# Patient Record
Sex: FEMALE | Race: BLACK OR AFRICAN AMERICAN | NOT HISPANIC OR LATINO | Employment: FULL TIME | URBAN - METROPOLITAN AREA
[De-identification: names, ages, dates, MRNs, and addresses within clinical notes are randomized per-mention and may not be internally consistent; named-entity substitution may affect disease eponyms.]

---

## 2020-03-17 ENCOUNTER — OFFICE VISIT (OUTPATIENT)
Dept: URGENT CARE | Facility: CLINIC | Age: 45
End: 2020-03-17
Payer: MEDICAID

## 2020-03-17 VITALS
HEART RATE: 105 BPM | OXYGEN SATURATION: 100 % | DIASTOLIC BLOOD PRESSURE: 90 MMHG | TEMPERATURE: 99 F | HEIGHT: 67 IN | SYSTOLIC BLOOD PRESSURE: 133 MMHG | BODY MASS INDEX: 23.17 KG/M2 | WEIGHT: 147.63 LBS

## 2020-03-17 DIAGNOSIS — R30.0 DYSURIA: ICD-10-CM

## 2020-03-17 DIAGNOSIS — K04.01 PULPITIS: ICD-10-CM

## 2020-03-17 DIAGNOSIS — K08.89 ODONTALGIA: Primary | ICD-10-CM

## 2020-03-17 LAB
B-HCG UR QL: NEGATIVE
BILIRUB UR QL STRIP: NEGATIVE
CTP QC/QA: YES
GLUCOSE UR QL STRIP: NEGATIVE
KETONES UR QL STRIP: NEGATIVE
LEUKOCYTE ESTERASE UR QL STRIP: NEGATIVE
PH, POC UA: 6 (ref 5–8)
POC BLOOD, URINE: NEGATIVE
POC NITRATES, URINE: NEGATIVE
PROT UR QL STRIP: NEGATIVE
SP GR UR STRIP: 1.02 (ref 1–1.03)
UROBILINOGEN UR STRIP-ACNC: NORMAL (ref 0.1–1.1)

## 2020-03-17 PROCEDURE — 81003 URINALYSIS AUTO W/O SCOPE: CPT | Mod: QW,S$GLB,, | Performed by: PHYSICIAN ASSISTANT

## 2020-03-17 PROCEDURE — 99213 PR OFFICE/OUTPT VISIT, EST, LEVL III, 20-29 MIN: ICD-10-PCS | Mod: 25,S$GLB,, | Performed by: PHYSICIAN ASSISTANT

## 2020-03-17 PROCEDURE — 81025 URINE PREGNANCY TEST: CPT | Mod: S$GLB,,, | Performed by: PHYSICIAN ASSISTANT

## 2020-03-17 PROCEDURE — 81025 POCT URINE PREGNANCY: ICD-10-PCS | Mod: S$GLB,,, | Performed by: PHYSICIAN ASSISTANT

## 2020-03-17 PROCEDURE — 99213 OFFICE O/P EST LOW 20 MIN: CPT | Mod: 25,S$GLB,, | Performed by: PHYSICIAN ASSISTANT

## 2020-03-17 PROCEDURE — 81003 POCT URINALYSIS, DIPSTICK, AUTOMATED, W/O SCOPE: ICD-10-PCS | Mod: QW,S$GLB,, | Performed by: PHYSICIAN ASSISTANT

## 2020-03-17 RX ORDER — METHOCARBAMOL 750 MG/1
1500 TABLET, FILM COATED ORAL 3 TIMES DAILY PRN
Qty: 20 TABLET | Refills: 0 | Status: SHIPPED | OUTPATIENT
Start: 2020-03-17 | End: 2020-03-27

## 2020-03-17 RX ORDER — AMLODIPINE BESYLATE 10 MG/1
10 TABLET ORAL DAILY
COMMUNITY

## 2020-03-17 RX ORDER — CHLORHEXIDINE GLUCONATE ORAL RINSE 1.2 MG/ML
10 SOLUTION DENTAL 2 TIMES DAILY
Qty: 200 ML | Refills: 0 | Status: SHIPPED | OUTPATIENT
Start: 2020-03-17 | End: 2020-03-27

## 2020-03-17 RX ORDER — AMOXICILLIN AND CLAVULANATE POTASSIUM 875; 125 MG/1; MG/1
1 TABLET, FILM COATED ORAL 2 TIMES DAILY
Qty: 20 TABLET | Refills: 0 | Status: SHIPPED | OUTPATIENT
Start: 2020-03-17 | End: 2020-03-27

## 2020-03-17 NOTE — PATIENT INSTRUCTIONS
"Continue with Tylenol as needed for pain. Robaxin for muscle stiffness/soreness. Be aware, this medication is sedating.  Do not mix with alcohol or any other sedating medications.  Do not drive or operate machinery when taking this medication.     Peridex mouthwash twice daily.     We will contact you with your vaginal swab results.    Please follow-up with a dentist.  Please follow-up with your OBGYN.  Dental Pain    A crack or cavity in a tooth can cause tooth pain. This is because the crack or cavity exposes the sensitive inner area of the tooth. An infection in the gum or the root of the tooth can cause pain and swelling. The pain is often made worse when you drink hot or cold beverages. It can also be worse when you bite on hard foods. Pain may spread from the tooth to your ear or the area of the jaw on the same side.  Home care  Follow these tips when caring for yourself at home:  · Avoid hot and cold foods and drinks. Your tooth may be sensitive to changes in temperature.  · Use toothpaste made for sensitive teeth. Brush gently up and down instead of sideways. Brushing sideways can wear away root surfaces if they are exposed.  · If your tooth is chipped or cracked, or if there is a large open cavity, put oil of cloves directly on the tooth to relieve pain. You can buy oil of cloves at drugstores. Some pharmacies carry an over-the-counter "toothache kit." This contains a paste that you can put on the exposed tooth to make it less sensitive.  · Put a cold pack on your jaw over the sore area to help reduce pain.  · You may use over-the-counter medicine to ease pain, unless your doctor prescribed another medicine. If you have chronic liver or kidney disease, talk with your healthcare provider before using acetaminophen or ibuprofen. Also talk with your provider if youve had a stomach ulcer or GI bleeding.  · If you have signs of an infection, you will be given an antibiotic. Take it as directed.  Follow-up " care  Follow up with your dentist, or as advised. Your pain may go away with the treatment given today. But only a dentist can fully look at and treat the cause of your pain. This will keep the pain from coming back.  Call 911  Call 911 if any of these occur:  · Unusual drowsiness  · Headache or stiff neck  · Weakness or fainting  · Difficulty swallowing or breathing  When to seek medical advice  Call your health care provider right away if any of these occur:  · Your face becomes swollen or red  · Pain gets worse or spreads to your neck  · Fever of 100.4º F (38.0º C) or higher, or as directed by your healthcare provider  · Pus drains from the tooth  Date Last Reviewed: 10/1/2016  © 5606-8414 [employer]. [address] [place] [address], [place] [address]. All rights reserved. This information is not intended as a substitute for professional medical care. Always follow your healthcare professional's instructions.

## 2020-03-17 NOTE — PROGRESS NOTES
"Subjective:       Patient ID: Marylin Jane is a 45 y.o. female.    Vitals:  height is 5' 7" (1.702 m) and weight is 67 kg (147 lb 9.6 oz). Her temperature is 98.8 °F (37.1 °C). Her blood pressure is 133/90 (abnormal) and her pulse is 105. Her oxygen saturation is 100%.     Chief Complaint: Dental Pain    Patient has pain inside of mouth with an infected tooth for two days    Dental Pain    This is a new problem. The current episode started in the past 7 days. The pain is at a severity of 9/10. Associated symptoms include difficulty swallowing. Pertinent negatives include no fever. Treatments tried: tylenol.       Constitution: Negative for chills, fatigue and fever.   HENT: Negative for congestion and sore throat.    Neck: Negative for painful lymph nodes.   Cardiovascular: Negative for chest pain and leg swelling.   Eyes: Negative for double vision and blurred vision.   Respiratory: Negative for cough and shortness of breath.    Gastrointestinal: Negative for abdominal pain, nausea, vomiting and diarrhea.   Genitourinary: Negative for dysuria, frequency, urgency, urine decreased, hematuria, history of kidney stones, painful menstruation, irregular menstruation, missed menses, heavy menstrual bleeding, ovarian cysts, genital trauma, vaginal pain, vaginal bleeding, vaginal odor, painful intercourse, genital sore, painful ejaculation and pelvic pain.   Musculoskeletal: Negative for joint pain, joint swelling, back pain, muscle cramps and muscle ache.        Pain inside of mouth with infected tooth   Skin: Positive for abscess. Negative for color change, pale, rash and bruising.   Allergic/Immunologic: Negative for seasonal allergies.   Neurological: Positive for tingling. Negative for dizziness, history of vertigo, light-headedness, passing out and headaches.   Hematologic/Lymphatic: Negative for swollen lymph nodes.   Psychiatric/Behavioral: Negative for nervous/anxious, sleep disturbance and depression. The patient " is not nervous/anxious.        Objective:      Physical Exam   Constitutional: She is oriented to person, place, and time. She appears well-developed and well-nourished. No distress.   Uncomfortable appearing, nontoxic   HENT:   Head: Normocephalic and atraumatic.   Tooth #32 cracked, pulp exposure. No intraoral edema. Jaw with full ROM without discomfort or difficulty.   Eyes: EOM are normal.   Neck: Normal range of motion. Neck supple.   Musculoskeletal: Normal range of motion.   Lymphadenopathy:     She has no cervical adenopathy.   Neurological: She is alert and oriented to person, place, and time.   Skin: Skin is warm, dry and not diaphoretic.   Psychiatric: She has a normal mood and affect. Her behavior is normal.   Nursing note and vitals reviewed.        Assessment:       1. Odontalgia    2. Dysuria    3. Pulpitis        Plan:         New vaginal discharge, +strong odor to urine. New sexual partner. Also odonatalgia with evidence of pulpitis. No jose's. No fever. No URI complaints. No significant comorbidities.     Vaginal swab, G/C pending. Will treat pain with Tylenol, robaxin. Start on Peridex, Augmentin    Odontalgia  -     Ambulatory referral/consult to Dentistry    Dysuria  -     POCT Urinalysis, Dipstick, Automated, W/O Scope  -     POCT urine pregnancy  -     NuSwab Vaginitis Plus (VG+)  -     C. trachomatis/N. gonorrhoeae by AMP DNA [facility]; Urine  -     C. trachomatis/N. gonorrhoeae by AMP DNA [facility]; Urine    Pulpitis  -     Ambulatory referral/consult to Dentistry    Other orders  -     methocarbamoL (ROBAXIN) 750 MG Tab; Take 2 tablets (1,500 mg total) by mouth 3 (three) times daily as needed (muscle stiffness/soreness).  Dispense: 20 tablet; Refill: 0  -     amoxicillin-clavulanate 875-125mg (AUGMENTIN) 875-125 mg per tablet; Take 1 tablet by mouth 2 (two) times daily. for 10 days  Dispense: 20 tablet; Refill: 0  -     chlorhexidine (PERIDEX) 0.12 % solution; Use as directed 10 mLs in the  mouth or throat 2 (two) times daily. for 10 days  Dispense: 200 mL; Refill: 0

## 2020-03-21 LAB
C TRACH RRNA SPEC QL NAA+PROBE: NEGATIVE
N GONORRHOEA RRNA SPEC QL NAA+PROBE: NEGATIVE

## 2020-03-23 ENCOUNTER — TELEPHONE (OUTPATIENT)
Dept: URGENT CARE | Facility: CLINIC | Age: 45
End: 2020-03-23

## 2020-03-25 LAB
A VAGINAE DNA VAG QL NAA+PROBE: ABNORMAL SCORE
BVAB2 DNA VAG QL NAA+PROBE: ABNORMAL SCORE
C ALBICANS DNA VAG QL NAA+PROBE: POSITIVE
C GLABRATA DNA VAG QL NAA+PROBE: NEGATIVE
C TRACH DNA VAG QL NAA+PROBE: NEGATIVE
MEGA1 DNA VAG QL NAA+PROBE: ABNORMAL SCORE
N GONORRHOEA DNA VAG QL NAA+PROBE: NEGATIVE
T VAGINALIS DNA VAG QL NAA+PROBE: NEGATIVE

## 2020-03-28 ENCOUNTER — TELEPHONE (OUTPATIENT)
Dept: URGENT CARE | Facility: CLINIC | Age: 45
End: 2020-03-28

## 2020-03-28 DIAGNOSIS — B96.89 BV (BACTERIAL VAGINOSIS): Primary | ICD-10-CM

## 2020-03-28 DIAGNOSIS — B37.9 YEAST INFECTION: ICD-10-CM

## 2020-03-28 DIAGNOSIS — N76.0 BV (BACTERIAL VAGINOSIS): Primary | ICD-10-CM

## 2020-03-28 RX ORDER — METRONIDAZOLE 7.5 MG/G
1 GEL VAGINAL NIGHTLY
Qty: 70 G | Refills: 0 | Status: SHIPPED | OUTPATIENT
Start: 2020-03-28 | End: 2020-04-02

## 2020-03-28 RX ORDER — FLUCONAZOLE 150 MG/1
150 TABLET ORAL DAILY
Qty: 2 TABLET | Refills: 0 | Status: SHIPPED | OUTPATIENT
Start: 2020-03-28 | End: 2020-03-29

## 2020-03-28 NOTE — TELEPHONE ENCOUNTER
Spoke with pt about results which show bacterial vaginosis and a yeast infection. I'm sending in flagyl and diflucan. Pt verbalized understanding and denied further questions.

## 2020-10-02 ENCOUNTER — CLINICAL SUPPORT (OUTPATIENT)
Dept: URGENT CARE | Facility: CLINIC | Age: 45
End: 2020-10-02
Payer: MEDICAID

## 2020-10-02 DIAGNOSIS — Z11.9 ENCOUNTER FOR SCREENING EXAMINATION FOR INFECTIOUS DISEASE: Primary | ICD-10-CM

## 2020-10-02 LAB
CTP QC/QA: YES
SARS-COV-2 RDRP RESP QL NAA+PROBE: NEGATIVE

## 2020-10-02 PROCEDURE — U0002 COVID-19 LAB TEST NON-CDC: HCPCS | Mod: QW,S$GLB,, | Performed by: NURSE PRACTITIONER

## 2020-10-02 PROCEDURE — U0002: ICD-10-PCS | Mod: QW,S$GLB,, | Performed by: NURSE PRACTITIONER

## 2020-12-07 ENCOUNTER — CLINICAL SUPPORT (OUTPATIENT)
Dept: URGENT CARE | Facility: CLINIC | Age: 45
End: 2020-12-07
Payer: MEDICAID

## 2020-12-07 DIAGNOSIS — Z11.9 ENCOUNTER FOR SCREENING EXAMINATION FOR INFECTIOUS DISEASE: Primary | ICD-10-CM

## 2020-12-07 LAB
CTP QC/QA: YES
SARS-COV-2 RDRP RESP QL NAA+PROBE: NEGATIVE

## 2020-12-07 PROCEDURE — 87635: ICD-10-PCS | Mod: QW,S$GLB,, | Performed by: INTERNAL MEDICINE

## 2020-12-07 PROCEDURE — 87635 SARS-COV-2 COVID-19 AMP PRB: CPT | Mod: QW,S$GLB,, | Performed by: INTERNAL MEDICINE

## 2020-12-07 NOTE — PATIENT INSTRUCTIONS
"Your test was NEGATIVE for COVID-19 (coronavirus).      You may leave home and/or return to work when the following conditions are met:   24 hours fever free without fever-reducing medications AND   Improved symptoms   You have not met the conditions of a closed exposure       A "close exposure" is defined as anyone who has had an exposure (masked or unmasked) to a known COVID -19 positive person within 6 ft for longer than 15 minutes. If your exposure meets this definition you are required by CDC guidelines to quarantine for 14 days from time of exposure regardless of test status.      Additional instructions:  · Social distance per your local guidelines  · Call ahead before visiting your doctor.  · Wear a facemask when around others who do not live in your household.  · Cover your coughs and sneezes.  · Wash your hands often with soap and water; hand  can be used, too.      If your symptoms worsen or if you have any other concerns, please contact [facility] at [phone].     Sincerely,    Leon Espinoza, RT  "

## 2020-12-07 NOTE — LETTER
[address] [facility], [address] [address]  Phone: [phone]  Fax: [phone]          Return to Work/School    Patient: Marylin Jane  YOB: 1975   Date: 12/07/2020     To Whom It May Concern:     Marylin Jane was in contact with/seen in my office on 12/07/2020. COVID-19 is present in our communities across the state. There is limited testing for COVID at this time, so not all patients can be tested. In this situation, your employee meets the following criteria:     Marylin Jane has met the criteria for COVID-19 testing and has a NEGATIVE result. The employee can return to work once they are asymptomatic for 24 hours without the use of fever reducing medications (Tylenol, Motrin, etc).     If you have any questions or concerns, or if I can be of further assistance, please do not hesitate to contact me.     Sincerely,    NURSE URGENT CARE, [facility] [place]

## 2021-03-20 ENCOUNTER — HOSPITAL ENCOUNTER (EMERGENCY)
Facility: HOSPITAL | Age: 46
Discharge: HOME | End: 2021-03-20
Attending: EMERGENCY MEDICINE
Payer: MEDICAID

## 2021-03-20 VITALS
DIASTOLIC BLOOD PRESSURE: 95 MMHG | OXYGEN SATURATION: 100 % | SYSTOLIC BLOOD PRESSURE: 139 MMHG | HEART RATE: 99 BPM | RESPIRATION RATE: 18 BRPM | BODY MASS INDEX: 25.34 KG/M2 | HEIGHT: 64 IN | TEMPERATURE: 99 F

## 2021-03-20 DIAGNOSIS — Z20.822 EXPOSURE TO COVID-19 VIRUS: Primary | ICD-10-CM

## 2021-03-20 LAB
B-HCG UR QL: NEGATIVE
CTP QC/QA: YES

## 2021-03-20 PROCEDURE — U0003 INFECTIOUS AGENT DETECTION BY NUCLEIC ACID (DNA OR RNA); SEVERE ACUTE RESPIRATORY SYNDROME CORONAVIRUS 2 (SARS-COV-2) (CORONAVIRUS DISEASE [COVID-19]), AMPLIFIED PROBE TECHNIQUE, MAKING USE OF HIGH THROUGHPUT TECHNOLOGIES AS DESCRIBED BY CMS-2020-01-R: HCPCS | Performed by: EMERGENCY MEDICINE

## 2021-03-20 PROCEDURE — 99284 EMERGENCY DEPT VISIT MOD MDM: CPT | Mod: 25,ER

## 2021-03-20 PROCEDURE — 81025 URINE PREGNANCY TEST: CPT | Mod: ER | Performed by: EMERGENCY MEDICINE

## 2021-03-20 RX ORDER — LOPERAMIDE HYDROCHLORIDE 2 MG/1
2 CAPSULE ORAL 4 TIMES DAILY PRN
Qty: 12 CAPSULE | Refills: 0 | Status: SHIPPED | OUTPATIENT
Start: 2021-03-20 | End: 2021-03-30

## 2021-03-20 RX ORDER — CETIRIZINE HYDROCHLORIDE 10 MG/1
10 TABLET ORAL DAILY
Qty: 30 TABLET | Refills: 0 | Status: SHIPPED | OUTPATIENT
Start: 2021-03-20 | End: 2022-03-20

## 2021-03-20 RX ORDER — ONDANSETRON 8 MG/1
8 TABLET, ORALLY DISINTEGRATING ORAL EVERY 6 HOURS PRN
Qty: 30 TABLET | Refills: 0 | Status: SHIPPED | OUTPATIENT
Start: 2021-03-20 | End: 2023-03-16 | Stop reason: CLARIF

## 2021-03-23 ENCOUNTER — TELEPHONE (OUTPATIENT)
Dept: EMERGENCY MEDICINE | Facility: HOSPITAL | Age: 46
End: 2021-03-23

## 2021-03-23 LAB — SARS-COV-2 RNA RESP QL NAA+PROBE: NOT DETECTED

## 2022-04-27 ENCOUNTER — OFFICE VISIT (OUTPATIENT)
Dept: URGENT CARE | Facility: CLINIC | Age: 47
End: 2022-04-27
Payer: MEDICAID

## 2022-04-27 VITALS
RESPIRATION RATE: 18 BRPM | HEIGHT: 64 IN | TEMPERATURE: 98 F | OXYGEN SATURATION: 97 % | DIASTOLIC BLOOD PRESSURE: 79 MMHG | WEIGHT: 147 LBS | HEART RATE: 104 BPM | BODY MASS INDEX: 25.1 KG/M2 | SYSTOLIC BLOOD PRESSURE: 123 MMHG

## 2022-04-27 DIAGNOSIS — M25.522 LEFT ELBOW PAIN: ICD-10-CM

## 2022-04-27 DIAGNOSIS — G89.29 CHRONIC ELBOW PAIN, LEFT: Primary | ICD-10-CM

## 2022-04-27 DIAGNOSIS — M25.522 CHRONIC ELBOW PAIN, LEFT: Primary | ICD-10-CM

## 2022-04-27 PROCEDURE — 73080 XR ELBOW COMPLETE 3 VIEW LEFT: ICD-10-PCS | Mod: LT,S$GLB,, | Performed by: RADIOLOGY

## 2022-04-27 PROCEDURE — 99203 PR OFFICE/OUTPT VISIT, NEW, LEVL III, 30-44 MIN: ICD-10-PCS | Mod: S$GLB,,, | Performed by: FAMILY MEDICINE

## 2022-04-27 PROCEDURE — 3074F PR MOST RECENT SYSTOLIC BLOOD PRESSURE < 130 MM HG: ICD-10-PCS | Mod: CPTII,S$GLB,, | Performed by: FAMILY MEDICINE

## 2022-04-27 PROCEDURE — 1160F RVW MEDS BY RX/DR IN RCRD: CPT | Mod: CPTII,S$GLB,, | Performed by: FAMILY MEDICINE

## 2022-04-27 PROCEDURE — 3078F PR MOST RECENT DIASTOLIC BLOOD PRESSURE < 80 MM HG: ICD-10-PCS | Mod: CPTII,S$GLB,, | Performed by: FAMILY MEDICINE

## 2022-04-27 PROCEDURE — 3008F BODY MASS INDEX DOCD: CPT | Mod: CPTII,S$GLB,, | Performed by: FAMILY MEDICINE

## 2022-04-27 PROCEDURE — 99203 OFFICE O/P NEW LOW 30 MIN: CPT | Mod: S$GLB,,, | Performed by: FAMILY MEDICINE

## 2022-04-27 PROCEDURE — 1159F MED LIST DOCD IN RCRD: CPT | Mod: CPTII,S$GLB,, | Performed by: FAMILY MEDICINE

## 2022-04-27 PROCEDURE — 3078F DIAST BP <80 MM HG: CPT | Mod: CPTII,S$GLB,, | Performed by: FAMILY MEDICINE

## 2022-04-27 PROCEDURE — 3074F SYST BP LT 130 MM HG: CPT | Mod: CPTII,S$GLB,, | Performed by: FAMILY MEDICINE

## 2022-04-27 PROCEDURE — 1159F PR MEDICATION LIST DOCUMENTED IN MEDICAL RECORD: ICD-10-PCS | Mod: CPTII,S$GLB,, | Performed by: FAMILY MEDICINE

## 2022-04-27 PROCEDURE — 1160F PR REVIEW ALL MEDS BY PRESCRIBER/CLIN PHARMACIST DOCUMENTED: ICD-10-PCS | Mod: CPTII,S$GLB,, | Performed by: FAMILY MEDICINE

## 2022-04-27 PROCEDURE — 73080 X-RAY EXAM OF ELBOW: CPT | Mod: LT,S$GLB,, | Performed by: RADIOLOGY

## 2022-04-27 PROCEDURE — 3008F PR BODY MASS INDEX (BMI) DOCUMENTED: ICD-10-PCS | Mod: CPTII,S$GLB,, | Performed by: FAMILY MEDICINE

## 2022-04-27 RX ORDER — LIDOCAINE 30 MG/G
1 CREAM TOPICAL 2 TIMES DAILY
Qty: 28.35 G | Refills: 0 | Status: SHIPPED | OUTPATIENT
Start: 2022-04-27 | End: 2023-03-16 | Stop reason: CLARIF

## 2022-04-27 NOTE — PATIENT INSTRUCTIONS
General Discharge Instructions   PLEASE READ YOUR DISCHARGE INSTRUCTIONS ENTIRELY AS IT CONTAINS IMPORTANT INFORMATION.  If you were prescribed a narcotic or controlled medication, do not drive or operate heavy equipment or machinery while taking these medications.  If you were prescribed antibiotics, please take them to completion.  You must understand that you've received an Urgent Care treatment only and that you may be released before all your medical problems are known or treated. You, the patient, will arrange for follow up care as instructed.    OVER THE COUNTER RECOMMENDATIONS/SUGGESTIONS.    Make sure to stay well hydrated.    Use Nasal Saline to mechanically move any post nasal drip from your eustachian tube or from the back of your throat.    Use warm salt water gargles to ease your throat pain. Warm salt water gargles as needed for sore throat- 1/2 tsp salt to 1 cup warm water, gargle as desired.    Use an antihistamine such as Claritin, Zyrtec or Allegra to dry you out.    Use pseudoephedrine (behind the counter) to decongest. Pseudoephedrine 30 mg up to 240 mg /day. It can raise your blood pressure and give you palpitations.    Use mucinex (guaifenesin) to break up mucous up to 2400mg/day to loosen any mucous.    The mucinex DM pill has a cough suppressant that can be sedating. It can be used at night to stop the tickle at the back of your throat.    You can use Mucinex D (it has guaifenesin and a high dose of pseudoephedrine) in the mornings to help decongest.    Use Afrin in each nare for no longer than 3 days, as it is addictive. It can also dry out your mucous membranes and cause elevated blood pressure. This is especially useful if you are flying.    Use Flonase 1-2 sprays/nostril per day. It is a local acting steroid nasal spray, if you develop a bloody nose, stop using the medication immediately.    Sometimes Nyquil at night is beneficial to help you get some rest, however it is sedating and it  does have an antihistamine, and tylenol.    Honey is a natural cough suppressant that can be used.    Tylenol up to 4,000 mg a day is safe for short periods and can be used for body aches, pain, and fever. However in high doses and prolonged use it can cause liver irritation.    Ibuprofen is a non-steroidal anti-inflammatory that can be used for body aches, pain, and fever.However it can also cause stomach irritation if over used.     Follow up with your PCP or specialty clinic as instructed in the next 2-3 days if not improved or as needed. You can call [phone] to schedule an appointment with appropriate provider.      If you condition worsens, we recommend that you receive another evaluation at the emergency room immediately or contact your primary medical clinic's after hours call service to discuss your concerns.      Please return here or go to the Emergency Department for any concerns or worsening condition.   You can also call [phone] to schedule an appointment with the appropriate provider.    Please return here or go to the Emergency Department for any concerns or worsening of condition.    Thank you for choosing [facility]!    Our goal in the Urgent Care is to always provide outstanding medical care. You may receive a survey by mail or e-mail in the next week regarding your experience today. We would greatly appreciate you completing and returning the survey. Your feedback provides us with a way to recognize our staff who provide very good care, and it helps us learn how to improve when your experience was below our aspiration of excellence.      We appreciate you trusting us with your medical care. We hope you feel better soon. We will be happy to take care of you for all of your future medical needs.    Sincerely,    JANE Berg  General Referral to [facility]  You were referred to [facility] to Establish Care and Management of your condition.  Please  call [phone] to schedule your appointment.    Please return here or go to the Emergency Department for any concerns or worsening of condition.  Please follow up with your primary care doctor or specialist in the next 48-72hrs as needed.    If you  smoke, please stop smoking.
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
## 2023-02-20 ENCOUNTER — HOSPITAL ENCOUNTER (EMERGENCY)
Facility: HOSPITAL | Age: 48
Discharge: HOME | End: 2023-02-20
Attending: EMERGENCY MEDICINE
Payer: MEDICAID

## 2023-02-20 VITALS
RESPIRATION RATE: 18 BRPM | OXYGEN SATURATION: 100 % | DIASTOLIC BLOOD PRESSURE: 97 MMHG | SYSTOLIC BLOOD PRESSURE: 157 MMHG | TEMPERATURE: 99 F | BODY MASS INDEX: 26.8 KG/M2 | HEIGHT: 64 IN | WEIGHT: 157 LBS | HEART RATE: 91 BPM

## 2023-02-20 DIAGNOSIS — Z53.21 ELOPED FROM EMERGENCY DEPARTMENT: Primary | ICD-10-CM

## 2023-02-20 PROCEDURE — 99281 EMR DPT VST MAYX REQ PHY/QHP: CPT | Mod: ER

## 2023-02-20 NOTE — ED PROVIDER NOTES
Encounter Date: 2/20/2023       History     Chief Complaint   Patient presents with    Abdominal Pain     Abd pain and bloating with nausea intermittent x years, worse today.      HPI  Patient eloped after triage and FPE    Review of patient's allergies indicates:   Allergen Reactions    Aspirin Hives     Past Medical History:   Diagnosis Date    Asthma     Hypertension      No past surgical history on file.  No family history on file.  Social History     Tobacco Use    Smoking status: Every Day     Packs/day: 1.00     Types: Cigarettes    Smokeless tobacco: Current   Substance Use Topics    Alcohol use: Yes    Drug use: Not Currently     Review of Systems  Patient eloped after triage and FPE  Physical Exam     Initial Vitals [02/20/23 1120]   BP Pulse Resp Temp SpO2   (!) 157/97 91 18 98.5 °F (36.9 °C) 100 %      MAP       --         Physical Exam  Patient eloped after triage and FPE  ED Course   Procedures  Labs Reviewed   POCT CBC   POCT CMP   POCT LIVER PANEL          Imaging Results    None          Medications - No data to display                           Clinical Impression:   Final diagnoses:  [Z53.21] Eloped from emergency department (Primary)        ED Disposition Condition    Eloped Stable                Monica Alcala, RENETTAP  02/20/23 1493

## 2023-03-16 ENCOUNTER — HOSPITAL ENCOUNTER (EMERGENCY)
Facility: HOSPITAL | Age: 48
Discharge: HOME | End: 2023-03-16
Attending: EMERGENCY MEDICINE
Payer: MEDICAID

## 2023-03-16 VITALS
TEMPERATURE: 99 F | RESPIRATION RATE: 18 BRPM | DIASTOLIC BLOOD PRESSURE: 83 MMHG | SYSTOLIC BLOOD PRESSURE: 168 MMHG | BODY MASS INDEX: 26.12 KG/M2 | HEART RATE: 80 BPM | HEIGHT: 64 IN | OXYGEN SATURATION: 100 % | WEIGHT: 153 LBS

## 2023-03-16 DIAGNOSIS — R07.9 CHEST PAIN, UNSPECIFIED TYPE: Primary | ICD-10-CM

## 2023-03-16 DIAGNOSIS — R10.84 GENERALIZED ABDOMINAL PAIN: ICD-10-CM

## 2023-03-16 DIAGNOSIS — R19.7 DIARRHEA, UNSPECIFIED TYPE: ICD-10-CM

## 2023-03-16 DIAGNOSIS — R07.9 CHEST PAIN: ICD-10-CM

## 2023-03-16 LAB
ALBUMIN SERPL-MCNC: 3.7 G/DL (ref 3.3–5.5)
ALP SERPL-CCNC: 46 U/L (ref 42–141)
B-HCG UR QL: NEGATIVE
BILIRUB SERPL-MCNC: 0.5 MG/DL (ref 0.2–1.6)
BILIRUBIN, POC UA: NEGATIVE
BLOOD, POC UA: NEGATIVE
BUN SERPL-MCNC: 12 MG/DL (ref 7–22)
CALCIUM SERPL-MCNC: 9.4 MG/DL (ref 8–10.3)
CHLORIDE SERPL-SCNC: 110 MMOL/L (ref 98–108)
CLARITY, POC UA: CLEAR
COLOR, POC UA: YELLOW
CREAT SERPL-MCNC: 0.8 MG/DL (ref 0.6–1.2)
CTP QC/QA: YES
GLUCOSE SERPL-MCNC: 108 MG/DL (ref 73–118)
GLUCOSE, POC UA: NEGATIVE
KETONES, POC UA: NEGATIVE
LEUKOCYTE EST, POC UA: NEGATIVE
NITRITE, POC UA: NEGATIVE
PH UR STRIP: 5.5 [PH]
POC ALT (SGPT): 28 U/L (ref 10–47)
POC AST (SGOT): 29 U/L (ref 11–38)
POC CARDIAC TROPONIN I: 0 NG/ML (ref 0–0.08)
POC CARDIAC TROPONIN I: 0 NG/ML (ref 0–0.08)
POC TCO2: 26 MMOL/L (ref 18–33)
POTASSIUM BLD-SCNC: 4.4 MMOL/L (ref 3.6–5.1)
PROTEIN, POC UA: NEGATIVE
PROTEIN, POC: 7.4 G/DL (ref 6.4–8.1)
SAMPLE: NORMAL
SAMPLE: NORMAL
SODIUM BLD-SCNC: 147 MMOL/L (ref 128–145)
SPECIFIC GRAVITY, POC UA: >=1.03
UROBILINOGEN, POC UA: 0.2 E.U./DL

## 2023-03-16 PROCEDURE — 80053 COMPREHEN METABOLIC PANEL: CPT | Mod: ER

## 2023-03-16 PROCEDURE — 96360 HYDRATION IV INFUSION INIT: CPT | Mod: 59,ER

## 2023-03-16 PROCEDURE — 99285 EMERGENCY DEPT VISIT HI MDM: CPT | Mod: 25,ER

## 2023-03-16 PROCEDURE — 25500020 PHARM REV CODE 255: Mod: ER | Performed by: EMERGENCY MEDICINE

## 2023-03-16 PROCEDURE — 93010 EKG 12-LEAD: ICD-10-PCS | Mod: ,,, | Performed by: INTERNAL MEDICINE

## 2023-03-16 PROCEDURE — 81003 URINALYSIS AUTO W/O SCOPE: CPT | Mod: ER

## 2023-03-16 PROCEDURE — 85025 COMPLETE CBC W/AUTO DIFF WBC: CPT | Mod: ER

## 2023-03-16 PROCEDURE — 93010 ELECTROCARDIOGRAM REPORT: CPT | Mod: ,,, | Performed by: INTERNAL MEDICINE

## 2023-03-16 PROCEDURE — 93005 ELECTROCARDIOGRAM TRACING: CPT | Mod: ER

## 2023-03-16 PROCEDURE — 84484 ASSAY OF TROPONIN QUANT: CPT | Mod: ER

## 2023-03-16 PROCEDURE — 81025 URINE PREGNANCY TEST: CPT | Mod: ER | Performed by: EMERGENCY MEDICINE

## 2023-03-16 PROCEDURE — 25000003 PHARM REV CODE 250: Mod: ER | Performed by: EMERGENCY MEDICINE

## 2023-03-16 RX ADMIN — IOHEXOL 100 ML: 350 INJECTION, SOLUTION INTRAVENOUS at 12:03

## 2023-03-16 RX ADMIN — SODIUM CHLORIDE 1000 ML: 9 INJECTION, SOLUTION INTRAVENOUS at 11:03

## 2023-03-16 NOTE — Clinical Note
"Marylin"Alberto Jane was seen and treated in our emergency department on 3/16/2023.  She may return to work on 03/17/2023.       If you have any questions or concerns, please don't hesitate to call.      Santa Dowell MD"

## 2023-03-16 NOTE — DISCHARGE INSTRUCTIONS
Drink plenty of clear fluids to replace fluid loss with diarrhea. Eat bland diet. Avoid imodium or pepto bismol.. You may take Gas-x for bloating or gas cramps. Follow up with GI for further evaluation.

## 2023-03-16 NOTE — ED NOTES
NAD AT THIS TIME. AAOX4. RX AND D/C INFOMATION GIVEN TO AND REVIEWED WITH PT. PT DENIES ANY FURTHER NEEDS OR QUESTIONS. VERBALIZED UNDERSTANDING TO AVOID GREASY, SPICY OR FRIED FOODS X 24HRS. SLOWLY INCREASE CLEAR FLUIDS AS TOLERATED. AMB OUT TO POV WITH STEADY GAIT.

## 2023-03-16 NOTE — ED PROVIDER NOTES
"Encounter Date: 3/16/2023    SCRIBE #1 NOTE: I, Maged Montes, am scribing for, and in the presence of,  Santa Dowell MD.     History     Chief Complaint   Patient presents with    Abdominal Pain     Reports abd bloating, left side pain, intermittent constipation. States took stool softener and then had diarrhea, then "just water came out", now passing blood    Rectal Bleeding     49 y/o female with asthma, HTN presents to the ED with blood in stool, abdominal pain, and diarrhea for 2 weeks. She notes that she is chronically constipated and intermittently distended. Over the last two weeks, when she takes her usual stool softeners, her stool is "like water" and recently contained blood. She no longer has the urge to defecate after these bowel movements. Patient also notes kidney stone; per chart review, most recent nephrolithiasis was on 10/17/22. Patient notes that she has been unable to follow up with urology because of insurance problems. Patient also notes cough with white sputum only when laying down at night. She denies any other associated symptoms. She is a smoker, 1 ppd. She is allergic to aspirin.     The history is provided by the patient. No  was used.   Review of patient's allergies indicates:   Allergen Reactions    Aspirin Hives     Past Medical History:   Diagnosis Date    Asthma     Hypertension      History reviewed. No pertinent surgical history.  History reviewed. No pertinent family history.  Social History     Tobacco Use    Smoking status: Every Day     Packs/day: 1.00     Types: Cigarettes    Smokeless tobacco: Current   Substance Use Topics    Alcohol use: Yes    Drug use: Not Currently     Review of Systems   Constitutional:  Negative for activity change, appetite change, chills and fever.   HENT:  Negative for congestion, rhinorrhea, sneezing and sore throat.    Respiratory:  Positive for cough. Negative for choking, shortness of breath and wheezing.    Cardiovascular:  " Negative for chest pain and palpitations.   Gastrointestinal:  Positive for abdominal distention (chronic), abdominal pain, blood in stool, constipation (chronic) and diarrhea. Negative for nausea and vomiting.   Neurological:  Negative for dizziness, syncope, light-headedness and headaches.   All other systems reviewed and are negative.    Physical Exam     Initial Vitals [03/16/23 1000]   BP Pulse Resp Temp SpO2   (!) 151/92 92 19 98.1 °F (36.7 °C) 100 %      MAP       --         Physical Exam    Nursing note and vitals reviewed.  Constitutional: She appears well-developed and well-nourished. No distress.   HENT:   Head: Normocephalic and atraumatic.   Eyes: Conjunctivae are normal.   Neck:   Normal range of motion.  Cardiovascular:  Normal rate, regular rhythm and normal heart sounds.           No murmur heard.  Pulmonary/Chest: Breath sounds normal. No respiratory distress.   Abdominal: Abdomen is soft. Bowel sounds are normal. She exhibits no distension and no mass. There is abdominal tenderness (see diagram). A hernia is present. Hernia confirmed positive in the umbilical area (reducible).     There is no rebound and no guarding.   Musculoskeletal:         General: No tenderness or edema. Normal range of motion.      Cervical back: Normal range of motion.     Neurological: She is alert and oriented to person, place, and time. GCS score is 15. GCS eye subscore is 4. GCS verbal subscore is 5. GCS motor subscore is 6.   Skin: Skin is warm and dry.   Psychiatric: She has a normal mood and affect. Her behavior is normal.       ED Course   Procedures  Labs Reviewed   POCT URINALYSIS W/O SCOPE - Abnormal; Notable for the following components:       Result Value    Spec Grav UA >=1.030 (*)     All other components within normal limits   POCT CMP - Abnormal; Notable for the following components:    POC Chloride 110 (*)     POC Sodium 147 (*)     All other components within normal limits   TROPONIN ISTAT   TROPONIN  ISTAT   POCT CBC   POCT URINALYSIS W/O SCOPE   POCT URINE PREGNANCY   POCT CMP   POCT TROPONIN   POCT TROPONIN     EKG Readings: (Independently Interpreted)   Initial Reading: No STEMI. Rhythm: Normal Sinus Rhythm. Heart Rate: 71. Ectopy: No Ectopy. Conduction: Normal. ST Segments: Normal ST Segments. T Waves: Normal. Clinical Impression: Normal Sinus Rhythm     Imaging Results              CT Abdomen Pelvis With Contrast (Final result)  Result time 03/16/23 12:31:39      Final result by Phillip Brambila MD (03/16/23 12:31:39)                   Impression:      1. Bilateral nonobstructive nephrolithiasis, no findings to suggest obstructive uropathy.  2. Hepatic steatosis, correlation with LFTs recommended.  3. Anterior wedge deformity of L1, no previous exams are available for comparison.  Correlation with any focal tenderness recommended.  4. Left ovarian cyst.  If there is discrete pain in the region, ultrasound could be performed for further evaluation.  5. Several additional findings above.      Electronically signed by: Phillip Brambila MD  Date:    03/16/2023  Time:    12:31               Narrative:    EXAMINATION:  CT ABDOMEN PELVIS WITH CONTRAST    CLINICAL HISTORY:  Bowel obstruction suspected;    TECHNIQUE:  Low dose axial images, sagittal and coronal reformations were obtained from the lung bases to the pubic symphysis following the IV administration of 100 mL of Omnipaque 350 .  Oral contrast was not given.    COMPARISON:  None.    FINDINGS:  Images of the lower thorax are remarkable for bilateral dependent atelectasis, mild.    The liver is hypoattenuating suggesting steatosis, correlation with LFTs recommended.  The spleen, pancreas, gallbladder and adrenal glands are grossly unremarkable.  There is no biliary dilation or ascites.  The portal vein, splenic vein, SMV, celiac axis and SMA all are patent.  The stomach is decompressed without wall thickening.  No significant abdominal  "lymphadenopathy.    The kidneys enhance symmetrically without hydronephrosis.  There is bilateral nonobstructive nephrolithiasis.  The bilateral ureters are unremarkable without calculi seen.  The urinary bladder is nondistended.  The uterus is absent.  There is a cyst within the left ovary measuring 2.8 cm.  No significant free fluid in the pelvis.    The distal large bowel is decompressed.  The terminal ileum and appendix are unremarkable.  The small bowel is grossly unremarkable.  There are several scattered shotty periaortic, pericaval, and mesenteric lymph nodes.  There is atherosclerotic calcification of the aorta and its branches.    There are degenerative changes of the spine.  There is anterior height loss involving L1, correlation with any focal tenderness recommended.  No significant inguinal lymphadenopathy.                                       X-Ray Chest AP Portable (Final result)  Result time 03/16/23 12:24:11      Final result by Freddie Forman MD (03/16/23 12:24:11)                   Impression:      No acute cardiopulmonary finding identified on this single view.      Electronically signed by: Freddie Forman MD  Date:    03/16/2023  Time:    12:24               Narrative:    EXAMINATION:  XR CHEST AP PORTABLE    CLINICAL HISTORY:  Provided history is "Chest Pain;  ".    TECHNIQUE:  One view of the chest.    COMPARISON:  None.    FINDINGS:  Cardiac silhouette is not enlarged.  No focal consolidation.  No sizable pleural effusion.  No pneumothorax.                                       Medications   sodium chloride 0.9% bolus 1,000 mL 1,000 mL (0 mLs Intravenous Stopped 3/16/23 1242)   iohexoL (OMNIPAQUE 350) injection 100 mL (100 mLs Intravenous Given 3/16/23 1205)     Medical Decision Making:   History:   Old Medical Records: I decided to obtain old medical records.  Independently Interpreted Test(s):   I have ordered and independently interpreted EKG Reading(s) - see prior notes  Clinical " Tests:   Lab Tests: Ordered and Reviewed  Radiological Study: Ordered and Reviewed  Medical Tests: Ordered and Reviewed  ED Management:  47 y/o female with asthma, HTN presents to the ED with blood in stool, abdominal pain, and diarrhea for 2 weeks. On exam, there is periumbilical tenderness and a reducible umbilical hernia. Abdomen soft and not distended. Work up shows no leukocytosis, normal renal function, normal LFTs. CT shows no signs of obstruction, gallbladder inflammation, pancreatitis, or diverticulitis. Recommend follow up with GI for further evaluation. While in ER she developed chest pain. EKG showed NSR with no arrhythmia and no signs of acute MI. CXR with no pneumonia or pneumothorax. Troponin x 2 was negative, so I doubt ACS.          Scribe Attestation:   Scribe #1: I performed the above scribed service and the documentation accurately describes the services I performed. I attest to the accuracy of the note.            I, Dr. Santa Dowell, personally performed the services described in this documentation.   All medical record entries made by the scribe were at my direction and in my presence.   I have reviewed the chart and agree that the record is accurate and complete.   Santa Dowell MD.  10:40 AM 03/16/2023        Clinical Impression:   Final diagnoses:  [R07.9] Chest pain  [R07.9] Chest pain, unspecified type (Primary)  [R10.84] Generalized abdominal pain  [R19.7] Diarrhea, unspecified type        ED Disposition Condition    Discharge Stable          ED Prescriptions    None       Follow-up Information       Follow up With Specialties Details Why Contact Info    [facility] Gastroenterology Schedule an appointment as soon as possible for a visit   [address] [place] [id number]  [phone]             Santa Dowell MD  03/16/23 1097

## 2023-06-29 ENCOUNTER — HOSPITAL ENCOUNTER (EMERGENCY)
Facility: HOSPITAL | Age: 48
Discharge: HOME | End: 2023-06-29
Attending: EMERGENCY MEDICINE
Payer: MEDICAID

## 2023-06-29 VITALS
TEMPERATURE: 98 F | HEART RATE: 87 BPM | BODY MASS INDEX: 28.32 KG/M2 | SYSTOLIC BLOOD PRESSURE: 164 MMHG | DIASTOLIC BLOOD PRESSURE: 99 MMHG | OXYGEN SATURATION: 100 % | RESPIRATION RATE: 16 BRPM | WEIGHT: 165 LBS

## 2023-06-29 DIAGNOSIS — R93.89 ABNORMAL FINDING ON CT SCAN: ICD-10-CM

## 2023-06-29 DIAGNOSIS — J06.9 VIRAL URI WITH COUGH: Primary | ICD-10-CM

## 2023-06-29 DIAGNOSIS — K04.90 DISEASE OF PERIAPICAL TISSUES OF TOOTH: ICD-10-CM

## 2023-06-29 DIAGNOSIS — R07.9 CHEST PAIN: ICD-10-CM

## 2023-06-29 DIAGNOSIS — K08.89 PAIN, DENTAL: ICD-10-CM

## 2023-06-29 DIAGNOSIS — F41.9 ANXIETY: ICD-10-CM

## 2023-06-29 LAB
ALBUMIN SERPL-MCNC: 4.1 G/DL (ref 3.3–5.5)
ALP SERPL-CCNC: 48 U/L (ref 42–141)
BILIRUB SERPL-MCNC: 0.4 MG/DL (ref 0.2–1.6)
BUN SERPL-MCNC: 12 MG/DL (ref 7–22)
CALCIUM SERPL-MCNC: 9.6 MG/DL (ref 8–10.3)
CHLORIDE SERPL-SCNC: 106 MMOL/L (ref 98–108)
CREAT SERPL-MCNC: 0.5 MG/DL (ref 0.6–1.2)
CTP QC/QA: YES
GLUCOSE SERPL-MCNC: 95 MG/DL (ref 73–118)
INFLUENZA A ANTIGEN, POC: NEGATIVE
INFLUENZA B ANTIGEN, POC: NEGATIVE
POC ALT (SGPT): 29 U/L (ref 10–47)
POC AST (SGOT): 33 U/L (ref 11–38)
POC RAPID STREP A: NEGATIVE
POC TCO2: 27 MMOL/L (ref 18–33)
POTASSIUM BLD-SCNC: 3.6 MMOL/L (ref 3.6–5.1)
PROTEIN, POC: 7.7 G/DL (ref 6.4–8.1)
SARS-COV-2 RDRP RESP QL NAA+PROBE: NEGATIVE
SODIUM BLD-SCNC: 142 MMOL/L (ref 128–145)

## 2023-06-29 PROCEDURE — 87635 SARS-COV-2 COVID-19 AMP PRB: CPT | Mod: ER

## 2023-06-29 PROCEDURE — 63600175 PHARM REV CODE 636 W HCPCS: Mod: ER

## 2023-06-29 PROCEDURE — 96374 THER/PROPH/DIAG INJ IV PUSH: CPT | Mod: ER

## 2023-06-29 PROCEDURE — 99285 EMERGENCY DEPT VISIT HI MDM: CPT | Mod: 25,ER

## 2023-06-29 PROCEDURE — 87804 INFLUENZA ASSAY W/OPTIC: CPT | Mod: ER

## 2023-06-29 PROCEDURE — 93010 EKG 12-LEAD: ICD-10-PCS | Mod: ,,, | Performed by: INTERNAL MEDICINE

## 2023-06-29 PROCEDURE — 25500020 PHARM REV CODE 255: Mod: ER | Performed by: EMERGENCY MEDICINE

## 2023-06-29 PROCEDURE — 25000003 PHARM REV CODE 250: Mod: ER

## 2023-06-29 PROCEDURE — 96375 TX/PRO/DX INJ NEW DRUG ADDON: CPT | Mod: ER

## 2023-06-29 PROCEDURE — 93010 ELECTROCARDIOGRAM REPORT: CPT | Mod: ,,, | Performed by: INTERNAL MEDICINE

## 2023-06-29 PROCEDURE — 93005 ELECTROCARDIOGRAM TRACING: CPT | Mod: ER

## 2023-06-29 RX ORDER — KETOROLAC TROMETHAMINE 30 MG/ML
15 INJECTION, SOLUTION INTRAMUSCULAR; INTRAVENOUS
Status: COMPLETED | OUTPATIENT
Start: 2023-06-29 | End: 2023-06-29

## 2023-06-29 RX ORDER — CLINDAMYCIN HYDROCHLORIDE 300 MG/1
300 CAPSULE ORAL EVERY 6 HOURS
Qty: 28 CAPSULE | Refills: 0 | Status: SHIPPED | OUTPATIENT
Start: 2023-06-29 | End: 2023-07-06

## 2023-06-29 RX ORDER — BENZONATATE 100 MG/1
100 CAPSULE ORAL 3 TIMES DAILY PRN
Qty: 30 CAPSULE | Refills: 0 | Status: SHIPPED | OUTPATIENT
Start: 2023-06-29

## 2023-06-29 RX ORDER — ACETAMINOPHEN 500 MG
500 TABLET ORAL EVERY 6 HOURS PRN
Qty: 20 TABLET | Refills: 0 | Status: SHIPPED | OUTPATIENT
Start: 2023-06-29

## 2023-06-29 RX ORDER — NAPROXEN 500 MG/1
500 TABLET ORAL 2 TIMES DAILY PRN
Qty: 30 TABLET | Refills: 0 | Status: SHIPPED | OUTPATIENT
Start: 2023-06-29

## 2023-06-29 RX ORDER — DEXAMETHASONE SODIUM PHOSPHATE 4 MG/ML
12 INJECTION, SOLUTION INTRA-ARTICULAR; INTRALESIONAL; INTRAMUSCULAR; INTRAVENOUS; SOFT TISSUE
Status: COMPLETED | OUTPATIENT
Start: 2023-06-29 | End: 2023-06-29

## 2023-06-29 RX ORDER — CETIRIZINE HYDROCHLORIDE 10 MG/1
10 TABLET ORAL DAILY PRN
Qty: 30 TABLET | Refills: 0 | Status: SHIPPED | OUTPATIENT
Start: 2023-06-29

## 2023-06-29 RX ORDER — CLINDAMYCIN HYDROCHLORIDE 150 MG/1
300 CAPSULE ORAL
Status: COMPLETED | OUTPATIENT
Start: 2023-06-29 | End: 2023-06-29

## 2023-06-29 RX ORDER — FLUTICASONE PROPIONATE 50 MCG
1 SPRAY, SUSPENSION (ML) NASAL 2 TIMES DAILY PRN
Qty: 15 G | Refills: 0 | Status: SHIPPED | OUTPATIENT
Start: 2023-06-29

## 2023-06-29 RX ORDER — PROMETHAZINE HYDROCHLORIDE AND DEXTROMETHORPHAN HYDROBROMIDE 6.25; 15 MG/5ML; MG/5ML
5 SYRUP ORAL EVERY 4 HOURS PRN
Qty: 118 ML | Refills: 0 | Status: SHIPPED | OUTPATIENT
Start: 2023-06-29

## 2023-06-29 RX ADMIN — CLINDAMYCIN HYDROCHLORIDE 300 MG: 150 CAPSULE ORAL at 07:06

## 2023-06-29 RX ADMIN — DEXAMETHASONE SODIUM PHOSPHATE 12 MG: 4 INJECTION INTRA-ARTICULAR; INTRALESIONAL; INTRAMUSCULAR; INTRAVENOUS; SOFT TISSUE at 07:06

## 2023-06-29 RX ADMIN — KETOROLAC TROMETHAMINE 15 MG: 30 INJECTION, SOLUTION INTRAMUSCULAR; INTRAVENOUS at 06:06

## 2023-06-29 RX ADMIN — IOHEXOL 100 ML: 350 INJECTION, SOLUTION INTRAVENOUS at 06:06

## 2023-06-29 NOTE — ED PROVIDER NOTES
"Encounter Date: 6/29/2023    SCRIBE #1 NOTE: I, Toni Grigsby, am scribing for, and in the presence of,  Alayna Holdsworth, PA-C. I have scribed the following portions of the note - Other sections scribed: HPI,ROS.     History     Chief Complaint   Patient presents with    OSMANYI     Marylin Jane, a 48 y.o. female presents to the ED via PV with CC of sore throat, nasal congestion, and cough onset approx 3 days ago. Pt denies N/v/D         Marylin Jane is a 48 y.o. female, with a PMHx of Asthma and HTN, who presents to the ED with cough, right ear pain, rhinorrhea, right sided facial pain, chills and congestion onset 2 days ago. Patient reports associated symptoms of sore throat, chest pain and headache. Patient also reports that an dental abscess in her mouth for last two months that "burst" 2 days ago that has led to a "white bumps" spreading across her gums. Patient states that the abscesses' discharge is "blood and pus". Patient states that she has a sharp pain in her chest. Patient endorses taking amoxicillin 800mg(BID, patient reports that she took the last dose this morning that was prescribed by her PCP), Singulair, zyrtec and Nyquil with minor alleviation to symptoms.  Patient states that her symptoms were too intense for her to wait until her PCP appointment(on 6/30/23) which is what brought her into the ED today. Patient reports having sick contacts at work. Patient notes that she does not currently have a dentist because it is hard to find one that takes her insurance but reports that she may have one that she will visit tomorrow. No other exacerbating or alleviating factors. Denies fever, SOB, nausea, vomiting, diarrhea, constipation, dysuria or other associated symptoms.       The history is provided by the patient. No  was used.   Review of patient's allergies indicates:   Allergen Reactions    Aspirin Hives     Past Medical History:   Diagnosis Date    Asthma     Hypertension  "     No past surgical history on file.  No family history on file.  Social History     Tobacco Use    Smoking status: Every Day     Packs/day: 1.00     Types: Cigarettes    Smokeless tobacco: Current   Substance Use Topics    Alcohol use: Yes    Drug use: Not Currently     Review of Systems   Constitutional:  Positive for chills and diaphoresis. Negative for fever.   HENT:  Positive for congestion, dental problem (swollen gums and discharge coming from gums), ear pain (right), facial swelling (right), rhinorrhea and sore throat. Negative for ear discharge and trouble swallowing.         (+) Facial Pain     Eyes:  Negative for visual disturbance.   Respiratory:  Positive for cough. Negative for shortness of breath.    Cardiovascular:  Positive for chest pain.   Gastrointestinal:  Negative for abdominal pain, constipation, diarrhea, nausea and vomiting.   Genitourinary:  Negative for decreased urine volume, difficulty urinating, dysuria, frequency and urgency.   Musculoskeletal:  Positive for myalgias (body aches).   Skin:  Negative for rash.   Neurological:  Positive for headaches. Negative for dizziness, weakness and light-headedness.   Psychiatric/Behavioral:  Negative for hallucinations. The patient is nervous/anxious.      Physical Exam     Initial Vitals [06/29/23 1729]   BP Pulse Resp Temp SpO2   (!) 151/92 104 18 98.8 °F (37.1 °C) 99 %      MAP       --         Physical Exam    Nursing note and vitals reviewed.  Constitutional: She appears well-developed and well-nourished. She is not diaphoretic. She is active. She does not appear ill. No distress.   HENT:   Head: Normocephalic and atraumatic.   Right Ear: External ear normal.   Left Ear: External ear normal.   Nose: Nose normal.   Mouth/Throat: Mucous membranes are normal. She does not have dentures. No trismus in the jaw. Abnormal dentition. Dental caries present. No dental abscesses, uvula swelling or lacerations. Oropharyngeal exudate and posterior  oropharyngeal erythema present. No tonsillar abscesses.       Right sided posterior swelling of gums and right sided submandibular swelling and TTP.    Eyes: Conjunctivae, EOM and lids are normal. Pupils are equal, round, and reactive to light. Right eye exhibits no discharge. Left eye exhibits no discharge.   Neck: Phonation normal. Neck supple.       Normal range of motion.   Full passive range of motion without pain.     Cardiovascular:  Normal rate and regular rhythm.           Pulmonary/Chest: Effort normal and breath sounds normal. No respiratory distress.   Abdominal: She exhibits no distension.   Musculoskeletal:         General: Normal range of motion.      Cervical back: Full passive range of motion without pain, normal range of motion and neck supple.     Neurological: She is alert and oriented to person, place, and time. GCS eye subscore is 4. GCS verbal subscore is 5. GCS motor subscore is 6.   Skin: Skin is dry. Capillary refill takes less than 2 seconds.       ED Course   Procedures  Labs Reviewed   POCT CMP - Abnormal; Notable for the following components:       Result Value    POC Creatinine 0.5 (*)     All other components within normal limits   SARS-COV-2 RDRP GENE    Narrative:     This test utilizes isothermal nucleic acid amplification technology to detect the SARS-CoV-2 RdRp nucleic acid segment. The analytical sensitivity (limit of detection) is 500 copies/swab.     A POSITIVE result is indicative of the presence of SARS-CoV-2 RNA; clinical correlation with patient history and other diagnostic information is necessary to determine patient infection status.    A NEGATIVE result means that SARS-CoV-2 nucleic acids are not present above the limit of detection. A NEGATIVE result should be treated as presumptive. It does not rule out the possibility of COVID-19 and should not be the sole basis for treatment decisions. If COVID-19 is strongly suspected based on clinical and exposure history,  re-testing using an alternate molecular assay should be considered.     This test is only for use under the Food and Drug Administration s Emergency Use Authorization (EUA).     Commercial kits are provided by [employer]. Performance characteristics of the EUA have been independently verified by [facility] Department of Pathology and Laboratory Medicine.   _________________________________________________________________   The authorized Fact Sheet for Healthcare Providers and the authorized Fact Sheet for Patients of the ID NOW COVID-19 are available on the FDA website:    https://www.fda.gov/media/872299/download      https://www.fda.gov/media/551715/download      POCT CBC   POCT INFLUENZA A/B MOLECULAR   POCT STREP A MOLECULAR   POCT CMP   POCT STREP A, RAPID   POCT RAPID INFLUENZA A/B     EKG Readings: (Independently Interpreted)   EKG showed normal sinus rhythm with a rate of 89 beats per minute.  TN interval 152 milliseconds.  QRS is 78 milliseconds.  QTC of 442 milliseconds.  Normal EKG.  No STEMI noted.  Signed by Dr. Guidry.     Imaging Results              CT Maxillofacial With Contrast (Final result)  Result time 06/29/23 19:10:51      Final result by Brooke Garvin MD (06/29/23 19:10:51)                   Impression:      1. Prominent periapical lucency involving the right mandibular 1st premolar. There is anterior cortical discontinuity involving the mandible in this region. No associated adjacent focal fluid collection or rim enhancing abscess appreciated in this region.  No evidence of sublingual/submandibular abscess.  2. Questionable small 7 mm developing peritonsillar abscess involving the superior aspect of the right palatine tonsil.  Correlate with clinical symptoms.      Electronically signed by: Brooke Garvin MD  Date:    06/29/2023  Time:    19:10               Narrative:    EXAMINATION:  CT MAXILLOFACIAL WITH CONTRAST    CLINICAL HISTORY:  Sublingual/submandibular  abscess;    TECHNIQUE:  Maxillofacial CT was performed following administration of 100 cc Omnipaque 350 IV contrast.  Sagittal and coronal reformats were obtained.    COMPARISON:  None    FINDINGS:  Prominent periapical lucency is seen involving the right mandibular 1st premolar.  There is anterior cortical discontinuity involving the mandible in this region.  No associated adjacent focal fluid collection or rim enhancing abscess is appreciated in this region.  Multiple missing dentition are seen.    Small 7 mm possible developing peritonsillar abscess is seen involving the superior aspect of the right palatine tonsil.  No evidence of retropharyngeal fluid collection or abscess.  No evidence of prevertebral soft tissue swelling.  Epiglottis is normal in thickness.  Parotid glands and submandibular glands are normal and symmetric in size.  Visualized portion of the thyroid is normal appearance.    There is mild right maxillary sinus disease.  Remaining visualized paranasal sinuses and mastoid air cells are clear.  Orbits are normal in appearance.  Visualized portion of the brain shows no acute abnormalities.  No acute facial fractures identified.  Visualized upper cervical spine shows no acute abnormalities, noting mild degenerative change in the lower cervical levels.                                       Medications   clindamycin capsule 300 mg (has no administration in time range)   ketorolac injection 15 mg (15 mg Intravenous Given 6/29/23 1828)   iohexoL (OMNIPAQUE 350) injection 100 mL (100 mLs Intravenous Given 6/29/23 1847)   dexAMETHasone injection 12 mg (12 mg Intravenous Given 6/29/23 1934)     Medical Decision Making:   History:   Old Medical Records: I decided to obtain old medical records.  Old Records Summarized: other records.       <> Summary of Records: External documents reviewed.     Initial Assessment:   Marylin Jane is a 48 y.o. female, with a PMHx of Asthma and HTN, who presents to the ED with  cough.  Patient's chart and medical history reviewed.  Differential Diagnosis:   COVID  Influenza  Strep pharyngitis  Viral pharyngitis  Vinay's angina  Retropharyngeal abscess  Tonsillar abscess  Facial abscess  Dental abscess  Submandibular abscess  Dental caries  Independently Interpreted Test(s):   I have ordered and independently interpreted EKG Reading(s) - see summary below       <> Summary of EKG Reading(s): EKG independently interpreted by Alayna Holdsworth, PA-C reads: see above.    Clinical Tests:   Lab Tests: Ordered and Reviewed  Radiological Study: Ordered and Reviewed  Medical Tests: Ordered and Reviewed  ED Management:  Patient's vitals reviewed.  She is afebrile, no respiratory distress, nontoxic-appearing in the ED. patient erythematous throat with exudates noted and right sided posterior swelling of gums and right sided submandibular swelling and TTP; and right-sided upper dental tenderness to palpation with caries and broken teeth noted; no dental abscess appreciated.  Patient given Toradol for pain.  EKG showed normal sinus rhythm with a rate of 89 beats per minute.  DC interval 152 milliseconds.  QRS is 78 milliseconds.  QTC of 442 milliseconds.  Normal EKG.  No STEMI noted.  Signed by Dr. Guidry.Patient is strep, COVID, and flu negative. CBC and CMP unremarkable. CT showed Prominent periapical lucency involving the right mandibular 1st premolar. There is anterior cortical discontinuity involving the mandible in this region. No associated adjacent focal fluid collection or rim enhancing abscess appreciated in this region.  No evidence of sublingual/submandibular abscess. Questionable small 7 mm developing peritonsillar abscess involving the superior aspect of the right palatine tonsil.  Patient given a dose of Decadron and clindamycin in the ED. Patient tolerated p.o. challenge.  Patient will be sent home on clindamycin for possible tonsil abscess.  Discussed with patient to rest and stay  well hydrated, she verbalized understanding.  Discussed with her her cough and upper respiratory symptoms is most likely due to a viral URI.  Patient be sent home on Motrin, Tylenol, Flonase, Zyrtec, Tessalon Perles, and promethazine DM cough syrup for symptomatic control.  Patient follow-up with her PCP tomorrow.  Patient agrees with this plan. Discussed with her strict return precautions, she verbalized understanding. Patient is stable for discharge.         Scribe Attestation:   Scribe #1: I performed the above scribed service and the documentation accurately describes the services I performed. I attest to the accuracy of the note.            I, Alayna Holdsworth,PA-C, personally performed the services described in this documentation.  All medical record entries made by the scribe were at my direction and in my presence.  I have reviewed the chart and agree that the record reflects my personal performance and is accurate and complete.         Clinical Impression:   Final diagnoses:  [R07.9] Chest pain  [J06.9] Viral URI with cough (Primary)  [K04.90] Disease of periapical tissues of tooth - Prominent periapical lucency involving the right mandibular 1st premolar  [R93.89] Abnormal finding on CT scan - Questionable small 7 mm developing peritonsillar abscess involving the superior aspect of the right palatine tonsil.   [F41.9] Anxiety  [K08.89] Pain, dental        ED Disposition Condition    Discharge Stable          ED Prescriptions       Medication Sig Dispense Start Date End Date Auth. Provider    clindamycin (CLEOCIN) 300 MG capsule Take 1 capsule (300 mg total) by mouth every 6 (six) hours. for 7 days 28 capsule 6/29/2023 7/6/2023 Alayna Holdsworth, PA-C    acetaminophen (TYLENOL) 500 MG tablet Take 1 tablet (500 mg total) by mouth every 6 (six) hours as needed for Pain or Temperature greater than. 20 tablet 6/29/2023 -- Alayna Holdsworth, PA-C    fluticasone propionate (FLONASE) 50 mcg/actuation nasal spray  1 spray (50 mcg total) by Each Nostril route 2 (two) times daily as needed for Rhinitis or Allergies. 15 g 6/29/2023 -- Alayna Holdsworth, PA-C    cetirizine (ZYRTEC) 10 MG tablet Take 1 tablet (10 mg total) by mouth daily as needed for Allergies or Rhinitis. 30 tablet 6/29/2023 -- Alayna Holdsworth, PA-C    benzonatate (TESSALON) 100 MG capsule Take 1 capsule (100 mg total) by mouth 3 (three) times daily as needed for Cough. 30 capsule 6/29/2023 -- Alayna Holdsworth, PA-C    promethazine-dextromethorphan (PROMETHAZINE-DM) 6.25-15 mg/5 mL Syrp Take 5 mLs by mouth every 4 (four) hours as needed (cough/congestion). 118 mL 6/29/2023 -- Alayna Holdsworth, PA-C    naproxen (NAPROSYN) 500 MG tablet Take 1 tablet (500 mg total) by mouth 2 (two) times daily as needed (Pain). 30 tablet 6/29/2023 -- Alayna Holdsworth, PA-C          Follow-up Information       Follow up With Specialties Details Why Contact Info    Your PCP        Your dentist                 Alayna Holdsworth, PA-C  06/29/23 1936

## 2023-06-30 NOTE — DISCHARGE INSTRUCTIONS

## 2024-06-04 ENCOUNTER — HOSPITAL ENCOUNTER (INPATIENT)
Facility: HOSPITAL | Age: 49
LOS: 1 days | Discharge: HOME | DRG: 313 | End: 2024-06-05
Attending: EMERGENCY MEDICINE | Admitting: STUDENT IN AN ORGANIZED HEALTH CARE EDUCATION/TRAINING PROGRAM
Payer: MEDICAID

## 2024-06-04 DIAGNOSIS — R73.9 HYPERGLYCEMIA: ICD-10-CM

## 2024-06-04 DIAGNOSIS — I21.4 NSTEMI (NON-ST ELEVATED MYOCARDIAL INFARCTION): Primary | ICD-10-CM

## 2024-06-04 DIAGNOSIS — R07.9 CHEST PAIN: ICD-10-CM

## 2024-06-04 DIAGNOSIS — R59.1 LYMPHADENOPATHY: ICD-10-CM

## 2024-06-04 LAB
ALBUMIN SERPL-MCNC: 3.8 G/DL (ref 3.3–5.5)
ALP SERPL-CCNC: 53 U/L (ref 42–141)
AMPHET+METHAMPHET UR QL: NEGATIVE
APTT PPP: 30.3 SEC (ref 21–32)
APTT PPP: 47.8 SEC (ref 21–32)
APTT PPP: 52.1 SEC (ref 21–32)
BARBITURATES UR QL SCN>200 NG/ML: NEGATIVE
BASOPHILS # BLD AUTO: 0.03 K/UL (ref 0–0.2)
BASOPHILS NFR BLD: 0.4 % (ref 0–1.9)
BENZODIAZ UR QL SCN>200 NG/ML: NEGATIVE
BILIRUB SERPL-MCNC: 0.5 MG/DL (ref 0.2–1.6)
BILIRUBIN, POC UA: NEGATIVE
BLOOD, POC UA: NEGATIVE
BUN SERPL-MCNC: 10 MG/DL (ref 7–22)
BZE UR QL SCN: NEGATIVE
CALCIUM SERPL-MCNC: 9.9 MG/DL (ref 8–10.3)
CANNABINOIDS UR QL SCN: NEGATIVE
CHLORIDE SERPL-SCNC: 104 MMOL/L (ref 98–108)
CLARITY, POC UA: CLEAR
COLOR, POC UA: YELLOW
CREAT SERPL-MCNC: 0.8 MG/DL (ref 0.6–1.2)
CREAT UR-MCNC: 70.1 MG/DL (ref 15–325)
DIFFERENTIAL METHOD BLD: NORMAL
EOSINOPHIL # BLD AUTO: 0.1 K/UL (ref 0–0.5)
EOSINOPHIL NFR BLD: 1.2 % (ref 0–8)
ERYTHROCYTE [DISTWIDTH] IN BLOOD BY AUTOMATED COUNT: 14 % (ref 11.5–14.5)
GLUCOSE SERPL-MCNC: 153 MG/DL (ref 73–118)
GLUCOSE, POC UA: ABNORMAL
HCT VFR BLD AUTO: 41.4 % (ref 37–48.5)
HCT, POC: NORMAL
HGB BLD-MCNC: 13.4 G/DL (ref 12–16)
HGB, POC: NORMAL (ref 14–18)
IMM GRANULOCYTES # BLD AUTO: 0.01 K/UL (ref 0–0.04)
IMM GRANULOCYTES NFR BLD AUTO: 0.1 % (ref 0–0.5)
INR PPP: 0.9 (ref 0.8–1.2)
KETONES, POC UA: NEGATIVE
LEUKOCYTE EST, POC UA: NEGATIVE
LYMPHOCYTES # BLD AUTO: 1.9 K/UL (ref 1–4.8)
LYMPHOCYTES NFR BLD: 28.2 % (ref 18–48)
MCH RBC QN AUTO: 29.3 PG (ref 27–31)
MCH, POC: NORMAL
MCHC RBC AUTO-ENTMCNC: 32.4 G/DL (ref 32–36)
MCHC, POC: NORMAL
MCV RBC AUTO: 90 FL (ref 82–98)
MCV, POC: NORMAL
METHADONE UR QL SCN>300 NG/ML: NEGATIVE
MONOCYTES # BLD AUTO: 0.5 K/UL (ref 0.3–1)
MONOCYTES NFR BLD: 7 % (ref 4–15)
MPV, POC: NORMAL
NEUTROPHILS # BLD AUTO: 4.3 K/UL (ref 1.8–7.7)
NEUTROPHILS NFR BLD: 63.1 % (ref 38–73)
NITRITE, POC UA: NEGATIVE
NRBC BLD-RTO: 0 /100 WBC
OPIATES UR QL SCN: NEGATIVE
PCP UR QL SCN>25 NG/ML: NEGATIVE
PH UR STRIP: 7 [PH]
PLATELET # BLD AUTO: 257 K/UL (ref 150–450)
PMV BLD AUTO: 10.5 FL (ref 9.2–12.9)
POC ALT (SGPT): 17 U/L (ref 10–47)
POC AST (SGOT): 22 U/L (ref 11–38)
POC B-TYPE NATRIURETIC PEPTIDE: 7.3
POC CARDIAC TROPONIN I: 0.24 NG/ML (ref 0–0.08)
POC PLATELET COUNT: NORMAL
POC PTINR: 1 (ref 0.9–1.2)
POC PTWBT: 12 SEC (ref 9.7–14.3)
POC TCO2: 27 MMOL/L (ref 18–33)
POTASSIUM BLD-SCNC: 3.9 MMOL/L (ref 3.6–5.1)
PROTEIN, POC UA: NEGATIVE
PROTEIN, POC: 8.4 G/DL (ref 6.4–8.1)
PROTHROMBIN TIME: 9.9 SEC (ref 9–12.5)
RBC # BLD AUTO: 4.58 M/UL (ref 4–5.4)
RBC, POC: NORMAL
RDW, POC: NORMAL
SAMPLE: ABNORMAL
SAMPLE: NORMAL
SODIUM BLD-SCNC: 138 MMOL/L (ref 128–145)
SPECIFIC GRAVITY, POC UA: 1.01
TOXICOLOGY INFORMATION: NORMAL
TROPONIN I SERPL DL<=0.01 NG/ML-MCNC: <0.006 NG/ML (ref 0–0.03)
TROPONIN I SERPL DL<=0.01 NG/ML-MCNC: <0.006 NG/ML (ref 0–0.03)
UROBILINOGEN, POC UA: 0.2 E.U./DL
WBC # BLD AUTO: 6.85 K/UL (ref 3.9–12.7)
WBC, POC: NORMAL

## 2024-06-04 PROCEDURE — 93005 ELECTROCARDIOGRAM TRACING: CPT | Mod: ER

## 2024-06-04 PROCEDURE — 25000003 PHARM REV CODE 250: Performed by: INTERNAL MEDICINE

## 2024-06-04 PROCEDURE — 25000242 PHARM REV CODE 250 ALT 637 W/ HCPCS: Mod: ER | Performed by: EMERGENCY MEDICINE

## 2024-06-04 PROCEDURE — 25000003 PHARM REV CODE 250: Performed by: STUDENT IN AN ORGANIZED HEALTH CARE EDUCATION/TRAINING PROGRAM

## 2024-06-04 PROCEDURE — 99291 CRITICAL CARE FIRST HOUR: CPT | Mod: ER

## 2024-06-04 PROCEDURE — 84484 ASSAY OF TROPONIN QUANT: CPT | Mod: 91 | Performed by: INTERNAL MEDICINE

## 2024-06-04 PROCEDURE — 63600175 PHARM REV CODE 636 W HCPCS: Mod: ER | Performed by: EMERGENCY MEDICINE

## 2024-06-04 PROCEDURE — 85730 THROMBOPLASTIN TIME PARTIAL: CPT | Mod: 91 | Performed by: STUDENT IN AN ORGANIZED HEALTH CARE EDUCATION/TRAINING PROGRAM

## 2024-06-04 PROCEDURE — 93010 ELECTROCARDIOGRAM REPORT: CPT | Mod: ,,, | Performed by: INTERNAL MEDICINE

## 2024-06-04 PROCEDURE — 80307 DRUG TEST PRSMV CHEM ANLYZR: CPT | Performed by: EMERGENCY MEDICINE

## 2024-06-04 PROCEDURE — 96375 TX/PRO/DX INJ NEW DRUG ADDON: CPT | Mod: ER

## 2024-06-04 PROCEDURE — 96374 THER/PROPH/DIAG INJ IV PUSH: CPT | Mod: ER

## 2024-06-04 PROCEDURE — 85610 PROTHROMBIN TIME: CPT | Performed by: EMERGENCY MEDICINE

## 2024-06-04 PROCEDURE — 85025 COMPLETE CBC W/AUTO DIFF WBC: CPT | Performed by: EMERGENCY MEDICINE

## 2024-06-04 PROCEDURE — 85730 THROMBOPLASTIN TIME PARTIAL: CPT | Performed by: EMERGENCY MEDICINE

## 2024-06-04 PROCEDURE — 25500020 PHARM REV CODE 255: Performed by: STUDENT IN AN ORGANIZED HEALTH CARE EDUCATION/TRAINING PROGRAM

## 2024-06-04 PROCEDURE — 84484 ASSAY OF TROPONIN QUANT: CPT | Performed by: STUDENT IN AN ORGANIZED HEALTH CARE EDUCATION/TRAINING PROGRAM

## 2024-06-04 PROCEDURE — 21400001 HC TELEMETRY ROOM

## 2024-06-04 PROCEDURE — 25000003 PHARM REV CODE 250: Mod: ER | Performed by: EMERGENCY MEDICINE

## 2024-06-04 PROCEDURE — 36415 COLL VENOUS BLD VENIPUNCTURE: CPT | Performed by: STUDENT IN AN ORGANIZED HEALTH CARE EDUCATION/TRAINING PROGRAM

## 2024-06-04 PROCEDURE — 99223 1ST HOSP IP/OBS HIGH 75: CPT | Mod: ,,, | Performed by: INTERNAL MEDICINE

## 2024-06-04 RX ORDER — CETIRIZINE HYDROCHLORIDE 10 MG/1
10 TABLET ORAL NIGHTLY
Status: DISCONTINUED | OUTPATIENT
Start: 2024-06-04 | End: 2024-06-05 | Stop reason: HOSPADM

## 2024-06-04 RX ORDER — IBUPROFEN 200 MG
1 TABLET ORAL DAILY
Status: DISCONTINUED | OUTPATIENT
Start: 2024-06-05 | End: 2024-06-05 | Stop reason: HOSPADM

## 2024-06-04 RX ORDER — FAMOTIDINE 10 MG/ML
40 INJECTION INTRAVENOUS
Status: COMPLETED | OUTPATIENT
Start: 2024-06-04 | End: 2024-06-04

## 2024-06-04 RX ORDER — ONDANSETRON HYDROCHLORIDE 2 MG/ML
8 INJECTION, SOLUTION INTRAVENOUS
Status: COMPLETED | OUTPATIENT
Start: 2024-06-04 | End: 2024-06-04

## 2024-06-04 RX ORDER — TALC
6 POWDER (GRAM) TOPICAL NIGHTLY
Status: DISCONTINUED | OUTPATIENT
Start: 2024-06-04 | End: 2024-06-05 | Stop reason: HOSPADM

## 2024-06-04 RX ORDER — IBUPROFEN 200 MG
24 TABLET ORAL
Status: DISCONTINUED | OUTPATIENT
Start: 2024-06-04 | End: 2024-06-05 | Stop reason: HOSPADM

## 2024-06-04 RX ORDER — ATORVASTATIN CALCIUM 40 MG/1
40 TABLET, FILM COATED ORAL NIGHTLY
Status: DISCONTINUED | OUTPATIENT
Start: 2024-06-04 | End: 2024-06-05 | Stop reason: HOSPADM

## 2024-06-04 RX ORDER — POLYETHYLENE GLYCOL 3350 17 G/17G
17 POWDER, FOR SOLUTION ORAL 2 TIMES DAILY PRN
Status: DISCONTINUED | OUTPATIENT
Start: 2024-06-04 | End: 2024-06-05 | Stop reason: HOSPADM

## 2024-06-04 RX ORDER — NALOXONE HCL 0.4 MG/ML
0.02 VIAL (ML) INJECTION
Status: DISCONTINUED | OUTPATIENT
Start: 2024-06-04 | End: 2024-06-05 | Stop reason: HOSPADM

## 2024-06-04 RX ORDER — MONTELUKAST SODIUM 5 MG/1
5 TABLET, CHEWABLE ORAL NIGHTLY
Status: DISCONTINUED | OUTPATIENT
Start: 2024-06-04 | End: 2024-06-05 | Stop reason: HOSPADM

## 2024-06-04 RX ORDER — AMLODIPINE BESYLATE 2.5 MG/1
2.5 TABLET ORAL DAILY
Status: DISCONTINUED | OUTPATIENT
Start: 2024-06-05 | End: 2024-06-05 | Stop reason: HOSPADM

## 2024-06-04 RX ORDER — HEPARIN SODIUM,PORCINE/D5W 25000/250
0-40 INTRAVENOUS SOLUTION INTRAVENOUS CONTINUOUS
Status: DISCONTINUED | OUTPATIENT
Start: 2024-06-04 | End: 2024-06-04

## 2024-06-04 RX ORDER — ACETAMINOPHEN 325 MG/1
650 TABLET ORAL EVERY 8 HOURS PRN
Status: DISCONTINUED | OUTPATIENT
Start: 2024-06-04 | End: 2024-06-05 | Stop reason: HOSPADM

## 2024-06-04 RX ORDER — IBUPROFEN 200 MG
16 TABLET ORAL
Status: DISCONTINUED | OUTPATIENT
Start: 2024-06-04 | End: 2024-06-05 | Stop reason: HOSPADM

## 2024-06-04 RX ORDER — GLUCAGON 1 MG
1 KIT INJECTION
Status: DISCONTINUED | OUTPATIENT
Start: 2024-06-04 | End: 2024-06-05 | Stop reason: HOSPADM

## 2024-06-04 RX ORDER — NITROGLYCERIN 0.4 MG/1
0.4 TABLET SUBLINGUAL
Status: DISCONTINUED | OUTPATIENT
Start: 2024-06-04 | End: 2024-06-05 | Stop reason: HOSPADM

## 2024-06-04 RX ADMIN — TICAGRELOR 90 MG: 90 TABLET ORAL at 09:06

## 2024-06-04 RX ADMIN — ONDANSETRON 8 MG: 2 INJECTION INTRAMUSCULAR; INTRAVENOUS at 10:06

## 2024-06-04 RX ADMIN — MONTELUKAST SODIUM 5 MG: 5 TABLET, CHEWABLE ORAL at 09:06

## 2024-06-04 RX ADMIN — CETIRIZINE HYDROCHLORIDE 10 MG: 10 TABLET, FILM COATED ORAL at 09:06

## 2024-06-04 RX ADMIN — Medication 6 MG: at 09:06

## 2024-06-04 RX ADMIN — FAMOTIDINE 40 MG: 10 INJECTION, SOLUTION INTRAVENOUS at 10:06

## 2024-06-04 RX ADMIN — IOHEXOL 75 ML: 350 INJECTION, SOLUTION INTRAVENOUS at 07:06

## 2024-06-04 RX ADMIN — HEPARIN SODIUM 12 UNITS/KG/HR: 10000 INJECTION, SOLUTION INTRAVENOUS at 09:06

## 2024-06-04 RX ADMIN — ATORVASTATIN CALCIUM 40 MG: 40 TABLET, FILM COATED ORAL at 09:06

## 2024-06-04 RX ADMIN — NITROGLYCERIN 0.4 MG: 0.4 TABLET SUBLINGUAL at 09:06

## 2024-06-04 RX ADMIN — TICAGRELOR 180 MG: 90 TABLET ORAL at 09:06

## 2024-06-04 NOTE — ED NOTES
Patient denies n/v or epigastric discomfort following meds given. Waiting bed assignment. Patient denies CP. Has no complaints. Will cont to monitor.

## 2024-06-04 NOTE — ADMISSIONCARE
AdmissionCare    Guideline: Myocardial Infarction, Inpatient    Based on the indications selected for the patient, the bed status of Inpatient was determined to be MET    The following indications were selected as present at the time of evaluation of the patient:        - New or presumed new elevation of cardiac troponin level and emergent intervention (eg, invasive coronary angiography, cardiac surgery) indicated (eg, suspected STEMI, treatment refractory myocardial ischemia or hypotension, ventricular rupture)     - Symptoms consistent with myocardial ischemia (eg, chest pain, dyspnea)    AdmissionCare documentation entered by: Rosalinda Miller    [facility] [employer], 28th edition, Copyright © 2024 [facility] [employer], [facility] All Rights Reserved.  [id number]

## 2024-06-04 NOTE — NURSING
[facility]  Nurses Note -- 4 Eyes      6/4/2024       Skin assessed on: Admit      [x] No Pressure Injuries Present    [x]Prevention Measures Documented    [] Yes LDA  for Pressure Injury Previously documented     [] Yes New Pressure Injury Discovered   [] LDA for New Pressure Injury Added      Attending RN:  Wendi Fitzgerald RN     Second RN:  BRIANNE Sullivan

## 2024-06-04 NOTE — ED PROVIDER NOTES
"Encounter Date: 6/4/2024    SCRIBE #1 NOTE: I, Michelle Hernandez, am scribing for, and in the presence of,  Mellissa Benavides DO. I have scribed the following portions of the note - Other sections scribed: HPI, ROS, PE, MDM.       History     Chief Complaint   Patient presents with    Pain     Pt reports generalized left sided body pain for the past 2 weeks   Pain has progressively gotten worse  Pt states she feels like she is going pass out       Marylin Jane is a 49 y.o. female with Hx of HTN and asthma, who presents to the ED for chief complaint of left sided   Neck pain, chest pain, and I am pain that began 3 days ago and increased last night. Patient describes the pain as "sticking" and "pins". Patient reports the pain on the left side of her neck is 8/10 pain in the chest is 7/10 and sticking pain in the fingers is 9/10.  Patient reports this has happened before, but is unsure of the diagnosis. Patient reports associated dry skin around mouth and  headache (currently resolved). Patient was concerned she has having a stroke today.  Patient reports family history of stroke and heart problems. Patient denies falling or trauma. Patient denies abdominal pain. Patient denies history of stroke. Patient has allergy to aspirin.     The history is provided by the patient. No  was used.     Review of patient's allergies indicates:   Allergen Reactions    Aspirin Hives     Past Medical History:   Diagnosis Date    Asthma     Hypertension      History reviewed. No pertinent surgical history.  No family history on file.  Social History     Tobacco Use    Smoking status: Every Day     Current packs/day: 1.00     Types: Cigarettes    Smokeless tobacco: Current   Substance Use Topics    Alcohol use: Yes    Drug use: Not Currently     Review of Systems   Constitutional:  Negative for fever.   HENT:  Negative for rhinorrhea and sore throat.    Eyes:  Positive for visual disturbance ("spots"). Negative for " redness.   Respiratory:  Negative for shortness of breath.    Cardiovascular:  Positive for chest pain (left sided). Negative for leg swelling.   Gastrointestinal:  Negative for abdominal pain, diarrhea, nausea and vomiting.   Musculoskeletal:  Positive for myalgias (left sided) and neck pain. Negative for back pain.   Skin:  Negative for rash.        (+) dry skin around mouth   Neurological:  Positive for headaches (resolved). Negative for syncope.   All other systems reviewed and are negative.      Physical Exam     Initial Vitals [06/04/24 0811]   BP Pulse Resp Temp SpO2   (!) 145/95 109 20 98 °F (36.7 °C) 98 %      MAP       --         Physical Exam    Nursing note and vitals reviewed.  Constitutional: She appears well-developed and well-nourished.   Patient gave consent to have physical exam performed.     HENT:   Head: Normocephalic and atraumatic.   Right Ear: External ear normal.   Left Ear: External ear normal.   Nose: Nose normal.   Mouth/Throat: Oropharynx is clear and moist.   Eyes: Conjunctivae and EOM are normal. Pupils are equal, round, and reactive to light.   Neck: Phonation normal. Neck supple.   Normal range of motion.  Cardiovascular:  Normal rate, regular rhythm, normal heart sounds, intact distal pulses and normal pulses.     Exam reveals no gallop and no friction rub.       No murmur heard.  Pulmonary/Chest: Effort normal and breath sounds normal. No stridor. No respiratory distress. She has no wheezes. She has no rhonchi. She has no rales. She exhibits no tenderness.   Abdominal: Abdomen is soft. Bowel sounds are normal. She exhibits no distension. There is no abdominal tenderness. There is no rigidity, no rebound and no guarding.   Genitourinary: Rectum:      Guaiac result negative.   Guaiac negative stool. : Acceptable.   Genitourinary Comments:   Rectal exam chaperoned by Marline Markham, RN   No gross blood appreciated.  Fecal occult negative     Musculoskeletal:          General: No tenderness or edema. Normal range of motion.      Cervical back: Normal range of motion and neck supple.     Neurological: She is alert and oriented to person, place, and time. She has normal strength. No cranial nerve deficit or sensory deficit. GCS score is 15. GCS eye subscore is 4. GCS verbal subscore is 5. GCS motor subscore is 6.   Weak left  compared to right (chronic due to previous injury).   Skin: Skin is warm and dry. Capillary refill takes less than 2 seconds. No rash noted.   Psychiatric: She has a normal mood and affect. Her behavior is normal.         ED Course   Critical Care    Date/Time: 6/4/2024 9:56 AM    Performed by: Mellissa Benavides DO  Authorized by: Mellissa Benavides DO  Direct patient critical care time: 9 minutes  Additional history critical care time: 9 minutes  Ordering / reviewing critical care time: 9 minutes  Documentation critical care time: 9 minutes  Consulting other physicians critical care time: 9 minutes  Consult with family critical care time: 9 minutes  Total critical care time (exclusive of procedural time) : 54 minutes  Critical care was necessary to treat or prevent imminent or life-threatening deterioration of the following conditions: cardiac failure and circulatory failure.  Critical care was time spent personally by me on the following activities: evaluation of patient's response to treatment, examination of patient, obtaining history from patient or surrogate, ordering and performing treatments and interventions, ordering and review of laboratory studies, ordering and review of radiographic studies, pulse oximetry, re-evaluation of patient's condition and review of old charts.        Labs Reviewed   TROPONIN ISTAT - Abnormal; Notable for the following components:       Result Value    POC Cardiac Troponin I 0.24 (*)     All other components within normal limits   POCT URINALYSIS W/O SCOPE - Abnormal; Notable for the following components:    Glucose, UA 1+  (*)     All other components within normal limits   POCT CMP - Abnormal; Notable for the following components:    POC Glucose 153 (*)     Protein, POC 8.4 (*)     All other components within normal limits   APTT   PROTIME-INR   CBC W/ AUTO DIFFERENTIAL   DRUG SCREEN PANEL, URINE EMERGENCY   POCT CBC   POCT URINALYSIS W/O SCOPE   POCT CMP   POCT PROTIME-INR   POCT TROPONIN   POCT B-TYPE NATRIURETIC PEPTIDE (BNP)   ISTAT PROCEDURE          Imaging Results              X-Ray Chest PA And Lateral (Final result)  Result time 06/04/24 09:18:06      Final result by Petey Hawthorne MD (06/04/24 09:18:06)                   Impression:      No acute cardiopulmonary abnormality.      Electronically signed by: Petey Hawthorne  Date:    06/04/2024  Time:    09:18               Narrative:    EXAMINATION:  XR CHEST PA AND LATERAL    CLINICAL HISTORY:  Chest Pain;    TECHNIQUE:  PA and lateral views of the chest were performed.    COMPARISON:  03/16/2023    FINDINGS:  Cardiomediastinal silhouette unchanged. Lungs appear similar without new confluent opacity or lobar consolidation. No pleural effusion or gross pneumothorax. No acute osseous abnormality.                                       CT Head Without Contrast (Final result)  Result time 06/04/24 09:24:04      Final result by Petey Hawthorne MD (06/04/24 09:24:04)                   Impression:      No acute intracranial abnormality.      Electronically signed by: Petey Hawthorne  Date:    06/04/2024  Time:    09:24               Narrative:    EXAMINATION:  CT HEAD WITHOUT CONTRAST    CLINICAL HISTORY:  Neuro deficit, acute, stroke suspected;    TECHNIQUE:  Low dose axial CT images obtained throughout the head without intravenous contrast. Sagittal and coronal reconstructions were performed.    COMPARISON:  None.    FINDINGS:  No evidence for major vascular distribution infarct, intracranial hemorrhage, extra-axial collection.  No midline shift or mass effect.  No  hydrocephalus.  The visualized paranasal sinuses and bilateral mastoid air cells are clear.  No acute calvarial abnormality.                                       Medications   nitroGLYCERIN SL tablet 0.4 mg (0.4 mg Sublingual Given 6/4/24 0915)   heparin 25,000 units in dextrose 5% (100 units/ml) IV bolus from bag LOW INTENSITY nomogram - [facility] (has no administration in time range)   heparin 25,000 units in dextrose 5% (100 units/ml) IV bolus from bag LOW INTENSITY nomogram - [facility] (has no administration in time range)   heparin 25,000 units in dextrose 5% 250 mL (100 units/mL) infusion LOW INTENSITY nomogram - [facility] (12 Units/kg/hr × 64.6 kg (Adjusted) Intravenous New Bag 6/4/24 0936)   heparin 25,000 units in dextrose 5% (100 units/ml) IV bolus from bag LOW INTENSITY nomogram - [facility] (3,550 Units Intravenous Bolus from Bag 6/4/24 0939)   ticagrelor tablet 180 mg (180 mg Oral Given 6/4/24 0935)   ondansetron injection 8 mg (8 mg Intravenous Given 6/4/24 1030)   famotidine (PF) injection 40 mg (40 mg Intravenous Given 6/4/24 1031)     Medical Decision Making  Amount and/or Complexity of Data Reviewed  Labs: ordered.  Radiology: ordered.  ECG/medicine tests: ordered and independent interpretation performed.     Details: EKG independently interpreted by Dr. Beanvides, see ED course.    Risk  Prescription drug management.  Decision regarding hospitalization.      Additional MDM:   Heart Score:    History:          Highly suspicious.  ECG:             Normal  Age:               45-65 years  Risk factors: >= 3 risk factors or history of atherosclerotic disease  Troponin:       1-2x normal limit  Heart Score = 6             Scribe Attestation:   Scribe #1: I performed the above scribed service and the documentation accurately describes the services I performed. I attest to the accuracy of the note.        ED Course as of 06/04/24 1101   Tue Jun 04, 2024   0861  EKG interpreted by Dr. Benavides.  No STEMI.  Sinus tachycardia  appreciated, ventricular rate of 102.  Abnormal EKG.  Q-waves appreciated in 2 3 and AVF.  When compared to previous EKG done on 06/29/2023 rate has increased by 13 beats per minute. [RF]   1100  Repeat EKG completed at 10:35 a.m. no STEMI.  Normal sinus rhythm, ventricular rate of 89.  Normal axis.  Normal EKG.  QTC normal at 450.  Leads 2 3 and AVF concerning for Q-wave [RF]      ED Course User Index  [RF] Mellissa Benavides DO          Medical Decision Making:    This is an evaluation of a 49 y.o. female that presents to the Emergency Department for   Chief Complaint   Patient presents with    Pain     Pt reports generalized left sided body pain for the past 2 weeks   Pain has progressively gotten worse  Pt states she feels like she is going pass out       The patient is a non-toxic and well appearing patient. On physical exam, patient appears well hydrated with moist mucus membranes. Breath sounds are clear and equal bilaterally with no adventitious breath sounds, tachypnea or respiratory distress. Regular rate and rhythm. No murmurs. Abdomen soft and non tender. Patient is tolerating PO without difficulty. Physical exam otherwise as above.     I have reviewed vital signs and nursing notes.   Vital Signs Are Reassuring.     Based on the patient's symptoms, I am considering and evaluating for the following differential diagnoses: anxiety, anxiety reaction, CVA, STEMI, NSTEMI, cardiac arrhythmia, HTN.    Consider hospitalization for: chest pain    Patient is agreeable to transfer and admission to [facility]    ED Course:Treatment in the ED included Physical Exam and medications given in ED  Medications   nitroGLYCERIN SL tablet 0.4 mg (0.4 mg Sublingual Given 6/4/24 0915)   heparin 25,000 units in dextrose 5% (100 units/ml) IV bolus from bag LOW INTENSITY nomogram - [facility] (has no administration in time range)   heparin 25,000 units in dextrose 5% (100 units/ml) IV bolus from bag LOW INTENSITY nomogram -  [facility] (has no administration in time range)   heparin 25,000 units in dextrose 5% 250 mL (100 units/mL) infusion LOW INTENSITY nomogram - [facility] (12 Units/kg/hr × 64.6 kg (Adjusted) Intravenous New Bag 6/4/24 0936)   heparin 25,000 units in dextrose 5% (100 units/ml) IV bolus from bag LOW INTENSITY nomogram - [facility] (3,550 Units Intravenous Bolus from Bag 6/4/24 0939)   ticagrelor tablet 180 mg (180 mg Oral Given 6/4/24 0935)   ondansetron injection 8 mg (8 mg Intravenous Given 6/4/24 1030)   famotidine (PF) injection 40 mg (40 mg Intravenous Given 6/4/24 1031)   .   Patient reports feeling better after treatment in the ER.       External Data/Documents Reviewed: Previous medical records and vital signs reviewed, see HPI and Physical exam.   Labs: ordered and reviewed.  Troponin elevated at 0.24.  Radiology: ordered as indicated and reviewed.  No pneumothorax  ECG/medicine tests: ordered and independent interpretation performed by Dr. Mellissa Benavides DO. Decision-making details documented in ED Course.   Cardiac monitor placed for  chest pain. Monitor shows Sinus Tachycardia with  rate of 105. Interpreted by Dr. Mellissa Benavides DO.      Risk  Diagnosis or treatment significantly limited by the following social determinants of health: Body mass index is 25.82 kg/m².     In shared decision making with the patient, we discussed treatment, prescriptions, labs, and imaging results. Patient asked me to speak to her fiance on the phone.  In presence of patient I spoke with her fiance.  We discussed  possible diagnoses as well as need for admit.  Patient and family are agreeable to transfer admission to [facility].      Cardiology consult initiated at 9:22 a.m..  Dr. Mcdermott recommends Brilinta and heparin secondary to patient's aspirin allergy will not give aspirin or Plavix.      Chest pain significantly improved after nitroglycerin.    I contacted  at time   9:27 a.m., requesting consult with virtual medicine Dr Miller  for admission. Requesting consultation with  hospitalist for services not available at this facility.   Discussed patient's presentation, past medical history, physical exam, labs, radiology results, vital signs, and ED course.      Patient accepted by  Dr Eddy Ceja for transfer and admission at time  10:33 a.m.   At this time patient will be transferred & admitted.  Patient will be transferred via EMS to accepting facility.      At time of admission patient is awake alert oriented x4 speaking clearly in full sentences and moving all 4 extremities.     The following labs and imaging were reviewed:        Admission on 06/04/2024   Component Date Value Ref Range Status    POC B-Type Natriuretic Peptide 06/04/2024 7.3   Final    POC PTWBT 06/04/2024 12.0  9.7 - 14.3 sec Final    POC PTINR 06/04/2024 1.0  0.9 - 1.2 Final    Sample 06/04/2024 unknown   Final    Albumin, POC 06/04/2024 3.8  3.3 - 5.5 g/dL Final    Alkaline Phosphatase, POC 06/04/2024 53  42 - 141 U/L Final    ALT (SGPT), POC 06/04/2024 17  10 - 47 U/L Final    AST (SGOT), POC 06/04/2024 22  11 - 38 U/L Final    POC BUN 06/04/2024 10  7 - 22 mg/dL Final    Calcium, POC 06/04/2024 9.9  8.0 - 10.3 mg/dL Final    POC Chloride 06/04/2024 104  98 - 108 mmol/L Final    POC Creatinine 06/04/2024 0.8  0.6 - 1.2 mg/dL Final    POC Glucose 06/04/2024 153 (H)  73 - 118 mg/dL Final    POC Potassium 06/04/2024 3.9  3.6 - 5.1 mmol/L Final    POC Sodium 06/04/2024 138  128 - 145 mmol/L Final    Bilirubin, POC 06/04/2024 0.5  0.2 - 1.6 mg/dL Final    POC TCO2 06/04/2024 27  18 - 33 mmol/L Final    Protein, POC 06/04/2024 8.4 (H)  6.4 - 8.1 g/dL Final    POC Cardiac Troponin I 06/04/2024 0.24 (H)  0.00 - 0.08 ng/mL Final    Sample 06/04/2024 unknown   Final    Comment: A single negative troponin is insufficient to rule out myocardial infarction.  The use of a serial sampling protocol is recommended practice. Correlate results with reference intervals established for  methodology used. Point of care and core laboratory   troponin results are not interchangeable.      Glucose, UA 06/04/2024 1+ (A)   Final    Bilirubin, UA 06/04/2024 Negative   Final    Ketones, UA 06/04/2024 Negative   Final    Spec Grav UA 06/04/2024 1.015   Final    Blood, UA 06/04/2024 Negative   Final    PH, UA 06/04/2024 7.0   Final    Protein, UA 06/04/2024 Negative   Final    Urobilinogen, UA 06/04/2024 0.2  E.U./dL Final    Nitrite, UA 06/04/2024 Negative   Final    Leukocytes, UA 06/04/2024 Negative   Final    Color, UA 06/04/2024 Yellow   Final    Clarity, UA 06/04/2024 Clear   Final        Imaging Results              X-Ray Chest PA And Lateral (Final result)  Result time 06/04/24 09:18:06      Final result by Petey Hawthorne MD (06/04/24 09:18:06)                   Impression:      No acute cardiopulmonary abnormality.      Electronically signed by: Petey Hawthorne  Date:    06/04/2024  Time:    09:18               Narrative:    EXAMINATION:  XR CHEST PA AND LATERAL    CLINICAL HISTORY:  Chest Pain;    TECHNIQUE:  PA and lateral views of the chest were performed.    COMPARISON:  03/16/2023    FINDINGS:  Cardiomediastinal silhouette unchanged. Lungs appear similar without new confluent opacity or lobar consolidation. No pleural effusion or gross pneumothorax. No acute osseous abnormality.                                       CT Head Without Contrast (Final result)  Result time 06/04/24 09:24:04      Final result by Petey Hawthorne MD (06/04/24 09:24:04)                   Impression:      No acute intracranial abnormality.      Electronically signed by: Petey Hawthorne  Date:    06/04/2024  Time:    09:24               Narrative:    EXAMINATION:  CT HEAD WITHOUT CONTRAST    CLINICAL HISTORY:  Neuro deficit, acute, stroke suspected;    TECHNIQUE:  Low dose axial CT images obtained throughout the head without intravenous contrast. Sagittal and coronal reconstructions were  performed.    COMPARISON:  None.    FINDINGS:  No evidence for major vascular distribution infarct, intracranial hemorrhage, extra-axial collection.  No midline shift or mass effect.  No hydrocephalus.  The visualized paranasal sinuses and bilateral mastoid air cells are clear.  No acute calvarial abnormality.                                                   I, Dr. Mellissa Benavides, personally performed the services described in this documentation. This document was produced by a scribe under my direction and in my presence. All medical record entries made by the scribe were at my direction and in my presence.  I have reviewed the chart and agree that the record reflects my personal performance and is accurate and complete. Mellissa Benavides DO.     06/04/2024 9:56 AM    Clinical Impression:  Final diagnoses:  [R07.9] Chest pain  [I21.4] NSTEMI (non-ST elevated myocardial infarction) (Primary)          ED Disposition Condition    Admit Stable                Mellissa Benavides DO  06/04/24 1101

## 2024-06-04 NOTE — ED NOTES
Patient states pain has decreased to 5/10 following SL Nitro. MD performed rectal exam. Heme negative upon exam. Heparin gtt initiated. Patient tolerating well at this time. Waiting admit orders and bed assignment. Patient updated on plan of care. Cardiac monitor in place. Vs stable. Will cont to monitor.

## 2024-06-04 NOTE — ED NOTES
Patient c/o L sided neck pain radiating to L shoulder and down to LLE. Patient reports intermittent L sided chest pain. Patient offered PRN SL Nitro. Patient agreed to take to see if it helps decrease discomfort. Patient rates current pain 8/10. Cardiac monitor placed. VS stable. Will cont to monitor

## 2024-06-04 NOTE — NURSING
Pt arrive to the unit by stretcher accompanied by EMS transport Assisted pt to bed. Tele monitoring initiated.  Admit assessment initiated.  Pt is AAOx4.  NO distress noted.

## 2024-06-05 VITALS
HEART RATE: 98 BPM | RESPIRATION RATE: 18 BRPM | BODY MASS INDEX: 25.72 KG/M2 | TEMPERATURE: 98 F | WEIGHT: 160.06 LBS | SYSTOLIC BLOOD PRESSURE: 133 MMHG | OXYGEN SATURATION: 99 % | HEIGHT: 66 IN | DIASTOLIC BLOOD PRESSURE: 81 MMHG

## 2024-06-05 LAB
ALBUMIN SERPL BCP-MCNC: 3.5 G/DL (ref 3.5–5.2)
ALP SERPL-CCNC: 41 U/L (ref 55–135)
ALT SERPL W/O P-5'-P-CCNC: 11 U/L (ref 10–44)
ANION GAP SERPL CALC-SCNC: 11 MMOL/L (ref 8–16)
ASCENDING AORTA: 2.51 CM
AST SERPL-CCNC: 13 U/L (ref 10–40)
AV INDEX (PROSTH): 0.8
AV MEAN GRADIENT: 2 MMHG
AV PEAK GRADIENT: 3 MMHG
AV VALVE AREA BY VELOCITY RATIO: 2.37 CM²
AV VALVE AREA: 2.64 CM²
AV VELOCITY RATIO: 0.72
BILIRUB SERPL-MCNC: 0.3 MG/DL (ref 0.1–1)
BSA FOR ECHO PROCEDURE: 1.84 M2
BUN SERPL-MCNC: 12 MG/DL (ref 6–20)
CALCIUM SERPL-MCNC: 9.6 MG/DL (ref 8.7–10.5)
CHLORIDE SERPL-SCNC: 104 MMOL/L (ref 95–110)
CHOLEST SERPL-MCNC: 245 MG/DL (ref 120–199)
CHOLEST/HDLC SERPL: 3.8 {RATIO} (ref 2–5)
CO2 SERPL-SCNC: 23 MMOL/L (ref 23–29)
CREAT SERPL-MCNC: 0.8 MG/DL (ref 0.5–1.4)
CV ECHO LV RWT: 0.76 CM
CV STRESS BASE HR: 73 BPM
DIASTOLIC BLOOD PRESSURE: 94 MMHG
DOP CALC AO PEAK VEL: 0.92 M/S
DOP CALC AO VTI: 15.9 CM
DOP CALC LVOT AREA: 3.3 CM2
DOP CALC LVOT DIAMETER: 2.05 CM
DOP CALC LVOT PEAK VEL: 0.66 M/S
DOP CALC LVOT STROKE VOLUME: 41.9 CM3
DOP CALC MV VTI: 21.5 CM
DOP CALCLVOT PEAK VEL VTI: 12.7 CM
E WAVE DECELERATION TIME: 197.2 MSEC
E/A RATIO: 1.37
E/E' RATIO: 10.12 M/S
ECHO LV POSTERIOR WALL: 1.22 CM (ref 0.6–1.1)
EST. GFR  (NO RACE VARIABLE): >60 ML/MIN/1.73 M^2
ESTIMATED AVG GLUCOSE: 117 MG/DL (ref 68–131)
FRACTIONAL SHORTENING: 26 % (ref 28–44)
GLUCOSE SERPL-MCNC: 96 MG/DL (ref 70–110)
HBA1C MFR BLD: 5.7 % (ref 4–5.6)
HCV AB SERPL QL IA: NORMAL
HDLC SERPL-MCNC: 65 MG/DL (ref 40–75)
HDLC SERPL: 26.5 % (ref 20–50)
HIV 1+2 AB+HIV1 P24 AG SERPL QL IA: NORMAL
INTERVENTRICULAR SEPTUM: 1.27 CM (ref 0.6–1.1)
IVC DIAMETER: 1.38 CM
IVRT: 74.22 MSEC
LA MAJOR: 4.73 CM
LA MINOR: 4.75 CM
LA WIDTH: 3.9 CM
LDLC SERPL CALC-MCNC: 161.6 MG/DL (ref 63–159)
LEFT ATRIUM SIZE: 2.94 CM
LEFT ATRIUM VOLUME INDEX: 25.4 ML/M2
LEFT ATRIUM VOLUME: 46.2 CM3
LEFT INTERNAL DIMENSION IN SYSTOLE: 2.38 CM (ref 2.1–4)
LEFT VENTRICLE DIASTOLIC VOLUME INDEX: 22.8 ML/M2
LEFT VENTRICLE DIASTOLIC VOLUME: 41.5 ML
LEFT VENTRICLE MASS INDEX: 70 G/M2
LEFT VENTRICLE SYSTOLIC VOLUME INDEX: 10.9 ML/M2
LEFT VENTRICLE SYSTOLIC VOLUME: 19.75 ML
LEFT VENTRICULAR INTERNAL DIMENSION IN DIASTOLE: 3.22 CM (ref 3.5–6)
LEFT VENTRICULAR MASS: 127.72 G
LV LATERAL E/E' RATIO: 8.6 M/S
LV SEPTAL E/E' RATIO: 12.29 M/S
LVOT MG: 0.89 MMHG
LVOT MV: 0.44 CM/S
MAGNESIUM SERPL-MCNC: 2 MG/DL (ref 1.6–2.6)
MV MEAN GRADIENT: 1 MMHG
MV PEAK A VEL: 0.63 M/S
MV PEAK E VEL: 0.86 M/S
MV PEAK GRADIENT: 3 MMHG
MV STENOSIS PRESSURE HALF TIME: 57.19 MS
MV VALVE AREA BY CONTINUITY EQUATION: 1.95 CM2
MV VALVE AREA P 1/2 METHOD: 3.85 CM2
NONHDLC SERPL-MCNC: 180 MG/DL
NUC REST EJECTION FRACTION: 65
OHS CV CPX 85 PERCENT MAX PREDICTED HEART RATE MALE: 145
OHS CV CPX MAX PREDICTED HEART RATE: 171
OHS CV CPX PATIENT IS FEMALE: 1
OHS CV CPX PATIENT IS MALE: 0
OHS CV CPX PEAK DIASTOLIC BLOOD PRESSURE: 103 MMHG
OHS CV CPX PEAK HEAR RATE: 144 BPM
OHS CV CPX PEAK RATE PRESSURE PRODUCT: NORMAL
OHS CV CPX PEAK SYSTOLIC BLOOD PRESSURE: 158 MMHG
OHS CV CPX PERCENT MAX PREDICTED HEART RATE ACHIEVED: 88
OHS CV CPX RATE PRESSURE PRODUCT PRESENTING: 9855
OHS CV RV/LV RATIO: 0.97 CM
PHOSPHATE SERPL-MCNC: 4.4 MG/DL (ref 2.7–4.5)
PISA TR MAX VEL: 0.67 M/S
POTASSIUM SERPL-SCNC: 3.9 MMOL/L (ref 3.5–5.1)
PROT SERPL-MCNC: 7 G/DL (ref 6–8.4)
PV PEAK GRADIENT: 3 MMHG
PV PEAK VELOCITY: 0.85 M/S
RA MAJOR: 4.43 CM
RA PRESSURE ESTIMATED: 3 MMHG
RA WIDTH: 3.4 CM
RIGHT VENTRICULAR END-DIASTOLIC DIMENSION: 3.12 CM
RV TB RVSP: 4 MMHG
RV TISSUE DOPPLER FREE WALL SYSTOLIC VELOCITY 1 (APICAL 4 CHAMBER VIEW): 11.53 CM/S
SINUS: 3.23 CM
SODIUM SERPL-SCNC: 138 MMOL/L (ref 136–145)
STJ: 1.97 CM
SYSTOLIC BLOOD PRESSURE: 135 MMHG
TDI LATERAL: 0.1 M/S
TDI SEPTAL: 0.07 M/S
TDI: 0.09 M/S
TR MAX PG: 2 MMHG
TRICUSPID ANNULAR PLANE SYSTOLIC EXCURSION: 2.33 CM
TRIGL SERPL-MCNC: 92 MG/DL (ref 30–150)
TROPONIN I SERPL DL<=0.01 NG/ML-MCNC: 0.01 NG/ML (ref 0–0.03)
TSH SERPL DL<=0.005 MIU/L-ACNC: 1.43 UIU/ML (ref 0.4–4)
TV REST PULMONARY ARTERY PRESSURE: 5 MMHG
Z-SCORE OF LEFT VENTRICULAR DIMENSION IN END DIASTOLE: -4.48
Z-SCORE OF LEFT VENTRICULAR DIMENSION IN END SYSTOLE: -2.12

## 2024-06-05 PROCEDURE — 80053 COMPREHEN METABOLIC PANEL: CPT | Performed by: STUDENT IN AN ORGANIZED HEALTH CARE EDUCATION/TRAINING PROGRAM

## 2024-06-05 PROCEDURE — 83735 ASSAY OF MAGNESIUM: CPT | Performed by: STUDENT IN AN ORGANIZED HEALTH CARE EDUCATION/TRAINING PROGRAM

## 2024-06-05 PROCEDURE — 83036 HEMOGLOBIN GLYCOSYLATED A1C: CPT | Performed by: STUDENT IN AN ORGANIZED HEALTH CARE EDUCATION/TRAINING PROGRAM

## 2024-06-05 PROCEDURE — 84443 ASSAY THYROID STIM HORMONE: CPT | Performed by: STUDENT IN AN ORGANIZED HEALTH CARE EDUCATION/TRAINING PROGRAM

## 2024-06-05 PROCEDURE — 63600175 PHARM REV CODE 636 W HCPCS: Performed by: INTERNAL MEDICINE

## 2024-06-05 PROCEDURE — 25000003 PHARM REV CODE 250: Performed by: STUDENT IN AN ORGANIZED HEALTH CARE EDUCATION/TRAINING PROGRAM

## 2024-06-05 PROCEDURE — 99233 SBSQ HOSP IP/OBS HIGH 50: CPT | Mod: 25,,, | Performed by: INTERNAL MEDICINE

## 2024-06-05 PROCEDURE — 84484 ASSAY OF TROPONIN QUANT: CPT | Performed by: STUDENT IN AN ORGANIZED HEALTH CARE EDUCATION/TRAINING PROGRAM

## 2024-06-05 PROCEDURE — 86803 HEPATITIS C AB TEST: CPT | Performed by: STUDENT IN AN ORGANIZED HEALTH CARE EDUCATION/TRAINING PROGRAM

## 2024-06-05 PROCEDURE — 87389 HIV-1 AG W/HIV-1&-2 AB AG IA: CPT | Performed by: STUDENT IN AN ORGANIZED HEALTH CARE EDUCATION/TRAINING PROGRAM

## 2024-06-05 PROCEDURE — 84100 ASSAY OF PHOSPHORUS: CPT | Performed by: STUDENT IN AN ORGANIZED HEALTH CARE EDUCATION/TRAINING PROGRAM

## 2024-06-05 PROCEDURE — S4991 NICOTINE PATCH NONLEGEND: HCPCS

## 2024-06-05 PROCEDURE — 25000003 PHARM REV CODE 250

## 2024-06-05 PROCEDURE — A9502 TC99M TETROFOSMIN: HCPCS | Performed by: STUDENT IN AN ORGANIZED HEALTH CARE EDUCATION/TRAINING PROGRAM

## 2024-06-05 PROCEDURE — 36415 COLL VENOUS BLD VENIPUNCTURE: CPT | Performed by: STUDENT IN AN ORGANIZED HEALTH CARE EDUCATION/TRAINING PROGRAM

## 2024-06-05 PROCEDURE — 80061 LIPID PANEL: CPT | Performed by: STUDENT IN AN ORGANIZED HEALTH CARE EDUCATION/TRAINING PROGRAM

## 2024-06-05 RX ORDER — MONTELUKAST SODIUM 5 MG/1
5 TABLET, CHEWABLE ORAL NIGHTLY
Qty: 30 TABLET | Refills: 0 | Status: SHIPPED | OUTPATIENT
Start: 2024-06-05 | End: 2024-07-05

## 2024-06-05 RX ORDER — REGADENOSON 0.08 MG/ML
0.4 INJECTION, SOLUTION INTRAVENOUS ONCE
Status: COMPLETED | OUTPATIENT
Start: 2024-06-05 | End: 2024-06-05

## 2024-06-05 RX ORDER — ATORVASTATIN CALCIUM 40 MG/1
40 TABLET, FILM COATED ORAL NIGHTLY
Qty: 90 TABLET | Refills: 3 | Status: SHIPPED | OUTPATIENT
Start: 2024-06-05 | End: 2025-06-05

## 2024-06-05 RX ADMIN — TETROFOSMIN 10.5 MILLICURIE: 1.38 INJECTION, POWDER, LYOPHILIZED, FOR SOLUTION INTRAVENOUS at 07:06

## 2024-06-05 RX ADMIN — TICAGRELOR 90 MG: 90 TABLET ORAL at 11:06

## 2024-06-05 RX ADMIN — ACETAMINOPHEN 650 MG: 325 TABLET ORAL at 10:06

## 2024-06-05 RX ADMIN — Medication 1 PATCH: at 11:06

## 2024-06-05 RX ADMIN — TETROFOSMIN 30.3 MILLICURIE: 1.38 INJECTION, POWDER, LYOPHILIZED, FOR SOLUTION INTRAVENOUS at 08:06

## 2024-06-05 RX ADMIN — ACETAMINOPHEN 650 MG: 325 TABLET ORAL at 12:06

## 2024-06-05 RX ADMIN — AMLODIPINE BESYLATE 2.5 MG: 2.5 TABLET ORAL at 11:06

## 2024-06-05 RX ADMIN — REGADENOSON 0.4 MG: 0.08 INJECTION, SOLUTION INTRAVENOUS at 08:06

## 2024-06-05 NOTE — HPI
"Patient is a pleasant 49-year-old lady.  Came in with left-sided chest pain, headache on the left side of her face, facial Brilinta swelling states that pain radiated from her chest all the way to her head as well as to her leg.  Now chest pain-free.  Initial troponin was mildly positive, 2nd troponin negative.  Third troponin pending.  Denies orthopnea PND swelling of feet.        EKG: nsr          Pain        Pt reports generalized left sided body pain for the past 2 weeks   Pain has progressively gotten worse  Pt states she feels like she is going pass out         Marylin Jane is a 49 y.o. female with Hx of HTN and asthma, who presents to the ED for chief complaint of left sided   Neck pain, chest pain, and I am pain that began 3 days ago and increased last night. Patient describes the pain as "sticking" and "pins". Patient reports the pain on the left side of her neck is 8/10 pain in the chest is 7/10 and sticking pain in the fingers is 9/10.  Patient reports this has happened before, but is unsure of the diagnosis. Patient reports associated dry skin around mouth and  headache (currently resolved). Patient was concerned she has having a stroke today.  Patient reports family history of stroke and heart problems. Patient denies falling or trauma. Patient denies abdominal pain. Patient denies history of stroke. Patient has allergy to aspirin.      "

## 2024-06-05 NOTE — PLAN OF CARE
Problem: Adult Inpatient Plan of Care  Goal: Absence of Hospital-Acquired Illness or Injury  Outcome: Progressing  Intervention: Identify and Manage Fall Risk  Flowsheets (Taken 6/5/2024 0327)  Safety Promotion/Fall Prevention:   assistive device/personal item within reach   lighting adjusted   medications reviewed   side rails raised x 2  Goal: Optimal Comfort and Wellbeing  Outcome: Progressing  Intervention: Monitor Pain and Promote Comfort  Flowsheets (Taken 6/5/2024 0327)  Pain Management Interventions: pillow support provided
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
## 2024-06-05 NOTE — SUBJECTIVE & OBJECTIVE
Past Medical History:   Diagnosis Date    Asthma     Hypertension        History reviewed. No pertinent surgical history.    Review of patient's allergies indicates:   Allergen Reactions    Aspirin Hives       No current facility-administered medications on file prior to encounter.     Current Outpatient Medications on File Prior to Encounter   Medication Sig    acetaminophen (TYLENOL) 500 MG tablet Take 1 tablet (500 mg total) by mouth every 6 (six) hours as needed for Pain or Temperature greater than.    amLODIPine (NORVASC) 10 MG tablet Take 10 mg by mouth once daily.    benzonatate (TESSALON) 100 MG capsule Take 1 capsule (100 mg total) by mouth 3 (three) times daily as needed for Cough.    cetirizine (ZYRTEC) 10 MG tablet Take 1 tablet (10 mg total) by mouth daily as needed for Allergies or Rhinitis.    fluticasone propionate (FLONASE) 50 mcg/actuation nasal spray 1 spray (50 mcg total) by Each Nostril route 2 (two) times daily as needed for Rhinitis or Allergies.    naproxen (NAPROSYN) 500 MG tablet Take 1 tablet (500 mg total) by mouth 2 (two) times daily as needed (Pain).    promethazine-dextromethorphan (PROMETHAZINE-DM) 6.25-15 mg/5 mL Syrp Take 5 mLs by mouth every 4 (four) hours as needed (cough/congestion).     Family History    None       Tobacco Use    Smoking status: Every Day     Current packs/day: 1.00     Types: Cigarettes    Smokeless tobacco: Current   Substance and Sexual Activity    Alcohol use: Yes    Drug use: Not Currently    Sexual activity: Not Currently     Review of Systems   Constitutional:  Positive for activity change, diaphoresis and fatigue. Negative for fever.   HENT:  Negative for sore throat and trouble swallowing.    Eyes:  Negative for redness and visual disturbance.   Respiratory:  Negative for chest tightness and shortness of breath.    Cardiovascular:  Positive for chest pain. Negative for palpitations and leg swelling.   Gastrointestinal:  Negative for abdominal  distention, abdominal pain, blood in stool, constipation, diarrhea, nausea and vomiting.   Endocrine: Negative for polyphagia and polyuria.   Genitourinary:  Negative for difficulty urinating, dysuria and hematuria.   Musculoskeletal:  Negative for neck pain and neck stiffness.   Skin:  Negative for pallor and rash.   Allergic/Immunologic: Negative for immunocompromised state.   Neurological:  Negative for dizziness, seizures, syncope and facial asymmetry.   Psychiatric/Behavioral:  Negative for agitation, behavioral problems and confusion.      Objective:     Vital Signs (Most Recent):  Temp: 98.1 °F (36.7 °C) (06/04/24 1557)  Pulse: 81 (06/04/24 1601)  Resp: 18 (06/04/24 1557)  BP: (!) 144/92 (06/04/24 1557)  SpO2: 97 % (06/04/24 1557) Vital Signs (24h Range):  Temp:  [98 °F (36.7 °C)-98.1 °F (36.7 °C)] 98.1 °F (36.7 °C)  Pulse:  [] 81  Resp:  [16-20] 18  SpO2:  [97 %-100 %] 97 %  BP: (127-145)/(72-95) 144/92     Weight: 72.6 kg (160 lb 0.9 oz)  Body mass index is 25.83 kg/m².     Physical Exam  Vitals and nursing note reviewed.   Constitutional:       General: She is not in acute distress.     Appearance: She is well-developed. She is obese. She is not ill-appearing, toxic-appearing or diaphoretic.   HENT:      Head: Normocephalic and atraumatic.   Eyes:      General: No scleral icterus.     Pupils: Pupils are equal, round, and reactive to light.   Neck:      Thyroid: No thyromegaly.   Cardiovascular:      Rate and Rhythm: Normal rate and regular rhythm.      Heart sounds: Murmur heard.   Pulmonary:      Effort: Pulmonary effort is normal.      Breath sounds: No stridor. No wheezing or rales.   Abdominal:      General: There is no distension.      Palpations: Abdomen is soft. There is no mass.      Tenderness: There is no guarding.   Musculoskeletal:         General: No deformity or signs of injury. Normal range of motion.      Cervical back: Normal range of motion.   Skin:     General: Skin is warm.       Capillary Refill: Capillary refill takes less than 2 seconds.      Coloration: Skin is not jaundiced.      Findings: No bruising.   Neurological:      Mental Status: She is alert and oriented to person, place, and time. Mental status is at baseline.      Cranial Nerves: No cranial nerve deficit.      Motor: No weakness.   Psychiatric:         Mood and Affect: Mood normal.         Behavior: Behavior normal.              CRANIAL NERVES     CN III, IV, VI   Pupils are equal, round, and reactive to light.           Recent Results (from the past 24 hour(s))   Troponin ISTAT    Collection Time: 06/04/24  8:43 AM   Result Value Ref Range    POC Cardiac Troponin I 0.24 (H) 0.00 - 0.08 ng/mL    Sample unknown    POCT CMP    Collection Time: 06/04/24  8:43 AM   Result Value Ref Range    Albumin, POC 3.8 3.3 - 5.5 g/dL    Alkaline Phosphatase, POC 53 42 - 141 U/L    ALT (SGPT), POC 17 10 - 47 U/L    AST (SGOT), POC 22 11 - 38 U/L    POC BUN 10 7 - 22 mg/dL    Calcium, POC 9.9 8.0 - 10.3 mg/dL    POC Chloride 104 98 - 108 mmol/L    POC Creatinine 0.8 0.6 - 1.2 mg/dL    POC Glucose 153 (H) 73 - 118 mg/dL    POC Potassium 3.9 3.6 - 5.1 mmol/L    POC Sodium 138 128 - 145 mmol/L    Bilirubin, POC 0.5 0.2 - 1.6 mg/dL    POC TCO2 27 18 - 33 mmol/L    Protein, POC 8.4 (H) 6.4 - 8.1 g/dL   ISTAT PROCEDURE    Collection Time: 06/04/24  8:44 AM   Result Value Ref Range    POC PTWBT 12.0 9.7 - 14.3 sec    POC PTINR 1.0 0.9 - 1.2    Sample unknown    POCT CBC    Collection Time: 06/04/24  8:45 AM   Result Value Ref Range    Hematocrit      Hemoglobin      RBC      WBC      MCV      MCH, POC      MCHC      RDW-CV      Platelet Count, POC      MPV     POCT B-type natriuretic peptide (BNP)    Collection Time: 06/04/24  8:59 AM   Result Value Ref Range    POC B-Type Natriuretic Peptide 7.3    POCT URINALYSIS W/O SCOPE    Collection Time: 06/04/24  9:27 AM   Result Value Ref Range    Glucose, UA 1+ (A)     Bilirubin, UA Negative     Ketones, UA  Negative     Spec Grav UA 1.015     Blood, UA Negative     PH, UA 7.0     Protein, UA Negative     Urobilinogen, UA 0.2 E.U./dL    Nitrite, UA Negative     Leukocytes, UA Negative     Color, UA Yellow     Clarity, UA Clear    APTT    Collection Time: 06/04/24  9:28 AM   Result Value Ref Range    aPTT 30.3 21.0 - 32.0 sec   Protime-INR    Collection Time: 06/04/24  9:28 AM   Result Value Ref Range    Prothrombin Time 9.9 9.0 - 12.5 sec    INR 0.9 0.8 - 1.2   CBC auto differential    Collection Time: 06/04/24  9:28 AM   Result Value Ref Range    WBC 6.85 3.90 - 12.70 K/uL    RBC 4.58 4.00 - 5.40 M/uL    Hemoglobin 13.4 12.0 - 16.0 g/dL    Hematocrit 41.4 37.0 - 48.5 %    MCV 90 82 - 98 fL    MCH 29.3 27.0 - 31.0 pg    MCHC 32.4 32.0 - 36.0 g/dL    RDW 14.0 11.5 - 14.5 %    Platelets 257 150 - 450 K/uL    MPV 10.5 9.2 - 12.9 fL    Immature Granulocytes 0.1 0.0 - 0.5 %    Gran # (ANC) 4.3 1.8 - 7.7 K/uL    Immature Grans (Abs) 0.01 0.00 - 0.04 K/uL    Lymph # 1.9 1.0 - 4.8 K/uL    Mono # 0.5 0.3 - 1.0 K/uL    Eos # 0.1 0.0 - 0.5 K/uL    Baso # 0.03 0.00 - 0.20 K/uL    nRBC 0 0 /100 WBC    Gran % 63.1 38.0 - 73.0 %    Lymph % 28.2 18.0 - 48.0 %    Mono % 7.0 4.0 - 15.0 %    Eosinophil % 1.2 0.0 - 8.0 %    Basophil % 0.4 0.0 - 1.9 %    Differential Method Automated    Drug screen panel, in-house    Collection Time: 06/04/24  9:30 AM   Result Value Ref Range    Benzodiazepines Negative Negative    Methadone metabolites Negative Negative    Cocaine (Metab.) Negative Negative    Opiate Scrn, Ur Negative Negative    Barbiturate Screen, Ur Negative Negative    Amphetamine Screen, Ur Negative Negative    THC Negative Negative    Phencyclidine Negative Negative    Creatinine, Urine 70.1 15.0 - 325.0 mg/dL    Toxicology Information SEE COMMENT    APTT    Collection Time: 06/04/24  3:44 PM   Result Value Ref Range    aPTT 52.1 (H) 21.0 - 32.0 sec   Troponin I    Collection Time: 06/04/24  3:44 PM   Result Value Ref Range     Troponin I <0.006 0.000 - 0.026 ng/mL       Microbiology Results (last 7 days)       ** No results found for the last 168 hours. **             Imaging Results              X-Ray Chest PA And Lateral (Final result)  Result time 06/04/24 09:18:06      Final result by Petey Hawthorne MD (06/04/24 09:18:06)                   Impression:      No acute cardiopulmonary abnormality.      Electronically signed by: Petey Hawthorne  Date:    06/04/2024  Time:    09:18               Narrative:    EXAMINATION:  XR CHEST PA AND LATERAL    CLINICAL HISTORY:  Chest Pain;    TECHNIQUE:  PA and lateral views of the chest were performed.    COMPARISON:  03/16/2023    FINDINGS:  Cardiomediastinal silhouette unchanged. Lungs appear similar without new confluent opacity or lobar consolidation. No pleural effusion or gross pneumothorax. No acute osseous abnormality.                                       CT Head Without Contrast (Final result)  Result time 06/04/24 09:24:04      Final result by Petey Hawthorne MD (06/04/24 09:24:04)                   Impression:      No acute intracranial abnormality.      Electronically signed by: Petey Hawthorne  Date:    06/04/2024  Time:    09:24               Narrative:    EXAMINATION:  CT HEAD WITHOUT CONTRAST    CLINICAL HISTORY:  Neuro deficit, acute, stroke suspected;    TECHNIQUE:  Low dose axial CT images obtained throughout the head without intravenous contrast. Sagittal and coronal reconstructions were performed.    COMPARISON:  None.    FINDINGS:  No evidence for major vascular distribution infarct, intracranial hemorrhage, extra-axial collection.  No midline shift or mass effect.  No hydrocephalus.  The visualized paranasal sinuses and bilateral mastoid air cells are clear.  No acute calvarial abnormality.
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
## 2024-06-05 NOTE — NURSING
Patient discharged per MD order.  IV removed.  Catheter tip intact.  No distress noted.  Discharge instructions explained by Susan Obrien RN.  AVS given to patient.  Patient verbalized understanding.  VSS. Afebrile.  No complaints of pain, N/V, diarrhea, or SOB.  Rx provided to home pharmacy.  Patient left with belongings to Main Entrance via wheelchair per transport.  Family with patient.

## 2024-06-05 NOTE — NURSING
Pt off the unit going to [facility] by wheelchair via transport. IV access in place, saline locked. NAD or pain noted.

## 2024-06-05 NOTE — SUBJECTIVE & OBJECTIVE
Past Medical History:   Diagnosis Date    Asthma     Hypertension        History reviewed. No pertinent surgical history.    Review of patient's allergies indicates:   Allergen Reactions    Aspirin Hives       No current facility-administered medications on file prior to encounter.     Current Outpatient Medications on File Prior to Encounter   Medication Sig    acetaminophen (TYLENOL) 500 MG tablet Take 1 tablet (500 mg total) by mouth every 6 (six) hours as needed for Pain or Temperature greater than.    amLODIPine (NORVASC) 10 MG tablet Take 10 mg by mouth once daily.    benzonatate (TESSALON) 100 MG capsule Take 1 capsule (100 mg total) by mouth 3 (three) times daily as needed for Cough.    cetirizine (ZYRTEC) 10 MG tablet Take 1 tablet (10 mg total) by mouth daily as needed for Allergies or Rhinitis.    fluticasone propionate (FLONASE) 50 mcg/actuation nasal spray 1 spray (50 mcg total) by Each Nostril route 2 (two) times daily as needed for Rhinitis or Allergies.    naproxen (NAPROSYN) 500 MG tablet Take 1 tablet (500 mg total) by mouth 2 (two) times daily as needed (Pain).    promethazine-dextromethorphan (PROMETHAZINE-DM) 6.25-15 mg/5 mL Syrp Take 5 mLs by mouth every 4 (four) hours as needed (cough/congestion).     Family History    None       Tobacco Use    Smoking status: Every Day     Current packs/day: 1.00     Types: Cigarettes    Smokeless tobacco: Current   Substance and Sexual Activity    Alcohol use: Yes    Drug use: Not Currently    Sexual activity: Not Currently     Review of Systems   Constitutional: Positive for malaise/fatigue.   HENT: Negative.     Eyes: Negative.    Cardiovascular:  Positive for chest pain.   Respiratory: Negative.     Endocrine: Negative.    Hematologic/Lymphatic: Negative.    Skin: Negative.    Musculoskeletal:  Positive for myalgias.   Gastrointestinal: Negative.    Genitourinary: Negative.    Neurological: Negative.    Psychiatric/Behavioral: Negative.      Allergic/Immunologic: Negative.      Objective:     Vital Signs (Most Recent):  Temp: 98.5 °F (36.9 °C) (06/04/24 1928)  Pulse: 77 (06/04/24 1928)  Resp: 18 (06/04/24 1928)  BP: 130/77 (06/04/24 1928)  SpO2: 98 % (06/04/24 1928) Vital Signs (24h Range):  Temp:  [98 °F (36.7 °C)-98.5 °F (36.9 °C)] 98.5 °F (36.9 °C)  Pulse:  [] 77  Resp:  [16-20] 18  SpO2:  [97 %-100 %] 98 %  BP: (127-145)/(72-95) 130/77     Weight: 72.6 kg (160 lb 0.9 oz)  Body mass index is 25.83 kg/m².    SpO2: 98 %         Intake/Output Summary (Last 24 hours) at 6/4/2024 2229  Last data filed at 6/4/2024 1726  Gross per 24 hour   Intake 0 ml   Output --   Net 0 ml       Lines/Drains/Airways       Peripheral Intravenous Line  Duration                  Peripheral IV - Single Lumen 06/04/24 0841 20 G Left Wrist <1 day         Peripheral IV - Single Lumen 06/04/24 0935 20 G Right Wrist <1 day                     Physical Exam  Constitutional:       Appearance: Normal appearance. She is well-developed.   HENT:      Head: Normocephalic.   Eyes:      Pupils: Pupils are equal, round, and reactive to light.   Cardiovascular:      Rate and Rhythm: Normal rate and regular rhythm.   Pulmonary:      Effort: Pulmonary effort is normal.      Breath sounds: Normal breath sounds.   Abdominal:      General: Bowel sounds are normal.      Palpations: Abdomen is soft.      Tenderness: There is no abdominal tenderness.   Musculoskeletal:         General: Normal range of motion.      Cervical back: Normal range of motion and neck supple.   Skin:     General: Skin is warm.   Neurological:      Mental Status: She is alert and oriented to person, place, and time.          Significant Labs:     DATA:     Laboratory:  CBC:  Recent Labs   Lab 06/04/24  0928   WBC 6.85   Hemoglobin 13.4   Hematocrit 41.4   Platelets 257       CHEMISTRIES:  Recent Labs   Lab 10/17/22  1119   Sodium 132 L   Potassium 3.5   BUN 16.6   Creatinine 0.78   eGFR African American 94   Calcium  "9.5       CARDIAC BIOMARKERS:  Recent Labs   Lab 06/04/24  1544 06/04/24  2152   Troponin I <0.006 <0.006       COAGS:  Recent Labs   Lab 06/04/24  0844 06/04/24  0928   INR 1.0 0.9       LIPIDS/LFTS:  Recent Labs   Lab 10/17/22  1119   AST 27   ALT 40       No results found for: "HGBA1C"    TSH        The ASCVD Risk score (Fabio DK, et al., 2019) failed to calculate for the following reasons:    The patient has a prior MI or stroke diagnosis       BNP    No results found for: "BNP"         ECHO    No results found for this or any previous visit.      STRESS TEST    No results found for this or any previous visit.        CATH    No results found for this or any previous visit.      "

## 2024-06-05 NOTE — NURSING
[facility] [place]  Nurses Note -- 4 Eyes      6/4/2024       Skin assessed on: Q Shift      [x] No Pressure Injuries Present    []Prevention Measures Documented    [] Yes LDA  for Pressure Injury Previously documented     [] Yes New Pressure Injury Discovered   [] LDA for New Pressure Injury Added      Attending RN:  Agustina Desouza RN     Second RN:  BRIANNE Adame

## 2024-06-05 NOTE — CONSULTS
"[facility] [facility]  Cardiology  Consult Note    Patient Name: Marylin Jane  MRN: [id number]  Admission Date: 6/4/2024  Hospital Length of Stay: 0 days  Code Status: Full Code   Attending Provider: Eddy Ceja MD   Consulting Provider: Susu Calvert MD  Primary Care Physician: Unable, To Obtain  Principal Problem:NSTEMI (non-ST elevated myocardial infarction)    Patient information was obtained from patient and ER records.     Inpatient consult to Cardiology  Consult performed by: Susu Calvert MD  Consult ordered by: Eddy Ceja MD        Subjective:     Chief Complaint:  cp     HPI:   Patient is a pleasant 49-year-old lady.  Came in with left-sided chest pain, headache on the left side of her face, facial Brilinta swelling states that pain radiated from her chest all the way to her head as well as to her leg.  Now chest pain-free.  Initial troponin was mildly positive, 2nd troponin negative.  Third troponin pending.  Denies orthopnea PND swelling of feet.        EKG: nsr          Pain        Pt reports generalized left sided body pain for the past 2 weeks   Pain has progressively gotten worse  Pt states she feels like she is going pass out         Marylin Jane is a 49 y.o. female with Hx of HTN and asthma, who presents to the ED for chief complaint of left sided   Neck pain, chest pain, and I am pain that began 3 days ago and increased last night. Patient describes the pain as "sticking" and "pins". Patient reports the pain on the left side of her neck is 8/10 pain in the chest is 7/10 and sticking pain in the fingers is 9/10.  Patient reports this has happened before, but is unsure of the diagnosis. Patient reports associated dry skin around mouth and  headache (currently resolved). Patient was concerned she has having a stroke today.  Patient reports family history of stroke and heart problems. Patient denies falling or trauma. Patient denies abdominal pain. Patient denies history of stroke. Patient has " allergy to aspirin.        Past Medical History:   Diagnosis Date    Asthma     Hypertension        History reviewed. No pertinent surgical history.    Review of patient's allergies indicates:   Allergen Reactions    Aspirin Hives       No current facility-administered medications on file prior to encounter.     Current Outpatient Medications on File Prior to Encounter   Medication Sig    acetaminophen (TYLENOL) 500 MG tablet Take 1 tablet (500 mg total) by mouth every 6 (six) hours as needed for Pain or Temperature greater than.    amLODIPine (NORVASC) 10 MG tablet Take 10 mg by mouth once daily.    benzonatate (TESSALON) 100 MG capsule Take 1 capsule (100 mg total) by mouth 3 (three) times daily as needed for Cough.    cetirizine (ZYRTEC) 10 MG tablet Take 1 tablet (10 mg total) by mouth daily as needed for Allergies or Rhinitis.    fluticasone propionate (FLONASE) 50 mcg/actuation nasal spray 1 spray (50 mcg total) by Each Nostril route 2 (two) times daily as needed for Rhinitis or Allergies.    naproxen (NAPROSYN) 500 MG tablet Take 1 tablet (500 mg total) by mouth 2 (two) times daily as needed (Pain).    promethazine-dextromethorphan (PROMETHAZINE-DM) 6.25-15 mg/5 mL Syrp Take 5 mLs by mouth every 4 (four) hours as needed (cough/congestion).     Family History    None       Tobacco Use    Smoking status: Every Day     Current packs/day: 1.00     Types: Cigarettes    Smokeless tobacco: Current   Substance and Sexual Activity    Alcohol use: Yes    Drug use: Not Currently    Sexual activity: Not Currently     Review of Systems   Constitutional: Positive for malaise/fatigue.   HENT: Negative.     Eyes: Negative.    Cardiovascular:  Positive for chest pain.   Respiratory: Negative.     Endocrine: Negative.    Hematologic/Lymphatic: Negative.    Skin: Negative.    Musculoskeletal:  Positive for myalgias.   Gastrointestinal: Negative.    Genitourinary: Negative.    Neurological: Negative.    Psychiatric/Behavioral:  Negative.     Allergic/Immunologic: Negative.      Objective:     Vital Signs (Most Recent):  Temp: 98.5 °F (36.9 °C) (06/04/24 1928)  Pulse: 77 (06/04/24 1928)  Resp: 18 (06/04/24 1928)  BP: 130/77 (06/04/24 1928)  SpO2: 98 % (06/04/24 1928) Vital Signs (24h Range):  Temp:  [98 °F (36.7 °C)-98.5 °F (36.9 °C)] 98.5 °F (36.9 °C)  Pulse:  [] 77  Resp:  [16-20] 18  SpO2:  [97 %-100 %] 98 %  BP: (127-145)/(72-95) 130/77     Weight: 72.6 kg (160 lb 0.9 oz)  Body mass index is 25.83 kg/m².    SpO2: 98 %         Intake/Output Summary (Last 24 hours) at 6/4/2024 2229  Last data filed at 6/4/2024 1726  Gross per 24 hour   Intake 0 ml   Output --   Net 0 ml       Lines/Drains/Airways       Peripheral Intravenous Line  Duration                  Peripheral IV - Single Lumen 06/04/24 0841 20 G Left Wrist <1 day         Peripheral IV - Single Lumen 06/04/24 0935 20 G Right Wrist <1 day                     Physical Exam  Constitutional:       Appearance: Normal appearance. She is well-developed.   HENT:      Head: Normocephalic.   Eyes:      Pupils: Pupils are equal, round, and reactive to light.   Cardiovascular:      Rate and Rhythm: Normal rate and regular rhythm.   Pulmonary:      Effort: Pulmonary effort is normal.      Breath sounds: Normal breath sounds.   Abdominal:      General: Bowel sounds are normal.      Palpations: Abdomen is soft.      Tenderness: There is no abdominal tenderness.   Musculoskeletal:         General: Normal range of motion.      Cervical back: Normal range of motion and neck supple.   Skin:     General: Skin is warm.   Neurological:      Mental Status: She is alert and oriented to person, place, and time.          Significant Labs:     DATA:     Laboratory:  CBC:  Recent Labs   Lab 06/04/24  0928   WBC 6.85   Hemoglobin 13.4   Hematocrit 41.4   Platelets 257       CHEMISTRIES:  Recent Labs   Lab 10/17/22  1119   Sodium 132 L   Potassium 3.5   BUN 16.6   Creatinine 0.78   eGFR African American  "94   Calcium 9.5       CARDIAC BIOMARKERS:  Recent Labs   Lab 06/04/24  1544 06/04/24  2152   Troponin I <0.006 <0.006       COAGS:  Recent Labs   Lab 06/04/24  0844 06/04/24  0928   INR 1.0 0.9       LIPIDS/LFTS:  Recent Labs   Lab 10/17/22  1119   AST 27   ALT 40       No results found for: "HGBA1C"    TSH        The ASCVD Risk score (Fabio DK, et al., 2019) failed to calculate for the following reasons:    The patient has a prior MI or stroke diagnosis       BNP    No results found for: "BNP"         ECHO    No results found for this or any previous visit.      STRESS TEST    No results found for this or any previous visit.        CATH    No results found for this or any previous visit.      Assessment and Plan:     Chest pain  Atypical.  Initial troponin was mildly elevated at [place], subsequent troponins at [facility] have been negative.  Discontinue heparin drip.  Check stress test and echo    Lymphadenopathy             VTE Risk Mitigation (From admission, onward)           Ordered     IP VTE LOW RISK PATIENT  Once         06/04/24 1834     Place sequential compression device  Until discontinued         06/04/24 1834                    Thank you for your consult. I will follow-up with patient. Please contact us if you have any additional questions.    Susu Calvert MD  Cardiology   [facility] - Med Surg      "

## 2024-06-05 NOTE — NURSING
[facility]  Nurses Note -- 4 Eyes      6/5/2024       Skin assessed on: Q Shift      [x] No Pressure Injuries Present    []Prevention Measures Documented    [] Yes LDA  for Pressure Injury Previously documented     [] Yes New Pressure Injury Discovered   [] LDA for New Pressure Injury Added      Attending RN:  Brisa Fischer, RN     Second RN:  MARTA Barry

## 2024-06-05 NOTE — PLAN OF CARE
Case Management Final Discharge Note      Discharge Disposition: home    New DME ordered / company name: na    Relevant SDOH / Transition of Care Barriers:  none    Primary Caretaker and contact information:     Scheduled followup appointment: see avs    Referrals placed: sent    Transportation: SO Kaitlyn [employer] will transport home at 2:30 PM         Patient and family educated on discharge services and updated on DC plan. Brisa/Bedside RN notified, patient clear to discharge from Case Management Perspective.    06/05/24 7310   Final Note   Assessment Type Final Discharge Note   Anticipated Discharge Disposition Home   What phone number can be called within the next 1-3 days to see how you are doing after discharge?   (see chart)   Hospital Resources/Appts/Education Provided Provided patient/caregiver with written discharge plan information;Appointments scheduled and added to AVS   Post-Acute Status   Coverage MEDICAID - [employer] [facility] ([place] MEDICA[place])   Discharge Delays None known at this time

## 2024-06-05 NOTE — ASSESSMENT & PLAN NOTE
Patient presented with NSTEMI  Troponin minimally elevated at 0.24 (on 0.00 - 0.08 ng/mL scale)  Repeat troponin here negative   Start heparin ACS protocol, continue for 48 hours or until cath/stent  Start DAPT   Allergic to Aspirin  Start ticagrelor   Lipid panel  Atorvastatin 40-80 mg daily  Start/Cont BB and ACEi/ARB, if tolerated  Lasix prn  Echo to assess LV/RV function and for hypokinesis   Trend troponins x3 or until peaks  Discussed aggressive risk factor modifications  Monitor on telemetry  Serial ECG and nitro SL as needed for chest pain  Consult interventional cardiology for ?cardiac catheterization vs ?stress

## 2024-06-05 NOTE — NURSING
Pt had cp 5/10 before kamari,then 8/10 during [facility] with radiating pain to left arm and neck.cp resolved but radiating pain remained 8/10,dr williamson notified.pt stated she had a similar pain yesterday before coming to hospital.dr williamson to room and assessed pt before sending pt back to her room.no change in order

## 2024-06-05 NOTE — ASSESSMENT & PLAN NOTE
Atypical.  Initial troponin was mildly elevated at [place], subsequent troponins at [facility] have been negative.  Discontinue heparin drip.  Check stress test and echo

## 2024-06-05 NOTE — NURSING
Pt back to unit from [facility] by wheelchair via transport. IV access in place, saline locked. NAD or pain noted.

## 2024-06-05 NOTE — PLAN OF CARE
.Case Management Assessment     PCP: Dr. ROMANO  Pharmacy: [facility]     Patient Arrived From: home  Existing Help at Home: Carolee Alegre (Relative)  [phone] (Mobile)     Barriers to Discharge: none    Discharge Plan:    A. Home    B. home      Independent lives with SO Kaitlyn Ambriz.  Pt drives but SO will dive home at discharge.          06/05/24 1052   Discharge Assessment   Assessment Type Discharge Planning Assessment   Confirmed/corrected address, phone number and insurance Yes   Source of Information patient   When was your last doctors appointment? 04/29/24   Communicated ALPESH with patient/caregiver No   Reason For Admission NSTEMI   People in Home significant other   Do you expect to return to your current living situation? Yes   Do you have help at home or someone to help you manage your care at home? Yes   Who are your caregiver(s) and their phone number(s)? Carolee Alegre (Relative)  [phone] (Mobile)   Prior to hospitilization cognitive status: Alert/Oriented   Current cognitive status: Alert/Oriented   Walking or Climbing Stairs Difficulty no   Dressing/Bathing Difficulty no   Equipment Currently Used at Home none   Readmission within 30 days? No   Patient currently being followed by outpatient case management? Unable to determine (comments)   Do you currently have service(s) that help you manage your care at home? No   Do you take prescription medications? Yes   Do you have prescription coverage? Yes   Coverage MEDICAID - [facility] [employer] [facility] ([place] MED[place]) -   Do you have any problems affording any of your prescribed medications? No   Who is going to help you get home at discharge? Carolee Alegre (Relative)  [phone] (Mobile)   How do you get to doctors appointments? car, drives self;family or friend will provide   Are you on dialysis? No   Do you take coumadin? No   Discharge Plan A Home;Home with family   Discharge Plan B Home;Home with family   DME Needed Upon Discharge   none   Discharge Plan discussed with: Adult children   Name(s) and Number(s) Carolee Alegre (Relative)  [phone] (Mobile)   Transition of Care Barriers None   OTHER   Name(s) of People in Home Carolee Alegre (Relative)  [phone] (Mobile)

## 2024-06-05 NOTE — ASSESSMENT & PLAN NOTE
Very large ?LN swelling on L mandible that presented in last 2 days  Appears more within the masseter muscle however  AFebrile and non-infectious appearing, and not reporting illness  Unilateral, and no other LN appreciated on ipsilateral side  Uncler if related to L sided neck and arm pain, but likely  May not be ACS related, unclear at this point   CT soft tissue neck to further assess

## 2024-06-06 LAB
OHS QRS DURATION: 76 MS
OHS QRS DURATION: 78 MS
OHS QTC CALCULATION: 450 MS
OHS QTC CALCULATION: 466 MS

## 2024-06-06 NOTE — PROGRESS NOTES
[facility]  Cardiology  Progress Note    Patient Name: Marylin Jane  MRN: [id number]  Admission Date: 6/4/2024  Hospital Length of Stay: 1 days  Code Status: Prior   Attending Physician: No att. providers found   Primary Care Physician: Unable, To Obtain  Expected Discharge Date: 6/5/2024  Principal Problem:NSTEMI (non-ST elevated myocardial infarction)    Subjective:       Interval History: no anginal sounding chest pains.  Stress test negative for ischemia    Review of Systems   Constitutional: Positive for malaise/fatigue.   HENT: Negative.     Eyes: Negative.    Respiratory: Negative.     Endocrine: Negative.    Hematologic/Lymphatic: Negative.    Skin: Negative.    Gastrointestinal: Negative.    Genitourinary: Negative.    Neurological: Negative.    Psychiatric/Behavioral: Negative.     Allergic/Immunologic: Negative.      Objective:     Vital Signs (Most Recent):  Temp: 98 °F (36.7 °C) (06/05/24 1114)  Pulse: 98 (06/05/24 1516)  Resp: 18 (06/05/24 1114)  BP: 133/81 (06/05/24 1114)  SpO2: 99 % (06/05/24 1114) Vital Signs (24h Range):  Temp:  [97.8 °F (36.6 °C)-98.2 °F (36.8 °C)] 98 °F (36.7 °C)  Pulse:  [63-98] 98  Resp:  [18-20] 18  SpO2:  [96 %-100 %] 99 %  BP: (127-137)/(81-85) 133/81     Weight: 72.6 kg (160 lb 0.9 oz)  Body mass index is 25.83 kg/m².     SpO2: 99 %         Intake/Output Summary (Last 24 hours) at 6/5/2024 2126  Last data filed at 6/5/2024 1308  Gross per 24 hour   Intake 120 ml   Output 2 ml   Net 118 ml       Lines/Drains/Airways       None                      Physical Exam  Constitutional:       Appearance: Normal appearance. She is well-developed.   HENT:      Head: Normocephalic.   Eyes:      Pupils: Pupils are equal, round, and reactive to light.   Cardiovascular:      Rate and Rhythm: Normal rate and regular rhythm.   Pulmonary:      Effort: Pulmonary effort is normal.      Breath sounds: Normal breath sounds.   Abdominal:      General: Bowel sounds are normal.       "Palpations: Abdomen is soft.      Tenderness: There is no abdominal tenderness.   Musculoskeletal:         General: Normal range of motion.      Cervical back: Normal range of motion and neck supple.   Skin:     General: Skin is warm.   Neurological:      Mental Status: She is alert and oriented to person, place, and time.            Significant Labs:     DATA:     Laboratory:  CBC:  Recent Labs   Lab 06/04/24  0928   WBC 6.85   Hemoglobin 13.4   Hematocrit 41.4   Platelets 257       CHEMISTRIES:  Recent Labs   Lab 10/17/22  1119 06/05/24  0522   Glucose  --  96   Sodium 132 L 138   Potassium 3.5 3.9   BUN 16.6 12   Creatinine 0.78 0.8   eGFR African American 94  --    Calcium 9.5 9.6   Magnesium  --  2.0       CARDIAC BIOMARKERS:  Recent Labs   Lab 06/04/24  1544 06/04/24  2152 06/05/24  0522   Troponin I <0.006 <0.006 0.006       COAGS:  Recent Labs   Lab 06/04/24  0844 06/04/24  0928   INR 1.0 0.9       LIPIDS/LFTS:  Recent Labs   Lab 10/17/22  1119 06/05/24  0522   Cholesterol  --  245 H   Triglycerides  --  92   HDL  --  65   LDL Cholesterol  --  161.6 H   Non-HDL Cholesterol  --  180   AST 27 13   ALT 40 11       Hemoglobin A1C   Date Value Ref Range Status   06/05/2024 5.7 (H) 4.0 - 5.6 % Final     Comment:     ADA Screening Guidelines:  5.7-6.4%  Consistent with prediabetes  >or=6.5%  Consistent with diabetes    High levels of fetal hemoglobin interfere with the HbA1C  assay. Heterozygous hemoglobin variants (HbS, HgC, etc)do  not significantly interfere with this assay.   However, presence of multiple variants may affect accuracy.         TSH  Recent Labs   Lab 06/05/24  0522   TSH 1.426       The ASCVD Risk score (Fabio DK, et al., 2019) failed to calculate for the following reasons:    The patient has a prior MI or stroke diagnosis       BNP    No results found for: "BNP"         ECHO    Results for orders placed during the hospital encounter of 06/04/24    Echo    Interpretation Summary    Left " Ventricle: Normal wall thickness. There is concentric remodeling. There is normal systolic function with a visually estimated ejection fraction of 60 - 65%. There is normal diastolic function.    Right Ventricle: Normal right ventricular cavity size. Systolic function is normal.    Left Atrium: Left atrium is mildly dilated.    Pulmonary Artery: The estimated pulmonary artery systolic pressure is 5 mmHg.    IVC/SVC: Normal venous pressure at 3 mmHg.      STRESS TEST    Results for orders placed during the hospital encounter of 06/04/24    Nuclear Stress - Cardiology Interpreted    Interpretation Summary    Normal myocardial perfusion scan. There is no evidence of myocardial ischemia or infarction.    The gated perfusion images showed an ejection fraction of 65% at rest.    There is normal wall motion at rest and post stress.    The ECG portion of the study is negative for ischemia.    The patient reported chest pain during the stress test.    During stress, rare PVCs are noted.        CATH    No results found for this or any previous visit.      Assessment and Plan:     Brief HPI:     Chest pain  Atypical.   Stress test negative for ischemia.  No further cardiac workup indicated at this hospitalization.  Will sign off.  Follow-up in Cardiology    Results for orders placed or performed during the hospital encounter of 06/29/23   EKG 12-lead    Collection Time: 06/29/23  6:20 PM    Narrative    Test Reason : R07.9,    Vent. Rate : 089 BPM     Atrial Rate : 089 BPM     P-R Int : 152 ms          QRS Dur : 078 ms      QT Int : 364 ms       P-R-T Axes : 065 066 039 degrees     QTc Int : 442 ms    Normal sinus rhythm  Normal ECG  When compared with ECG of 16-MAR-2023 11:44,  Nonspecific T wave abnormality now evident in Anterior leads  Confirmed by Nehal BAH, Susu NATION ([id number]) on 6/30/2023 12:43:34 AM    Referred By: [email or web address]   SELF           Confirmed By:Susu Calvert MD       Results for orders placed during the hospital  encounter of 06/04/24    Echo    Interpretation Summary    Left Ventricle: Normal wall thickness. There is concentric remodeling. There is normal systolic function with a visually estimated ejection fraction of 60 - 65%. There is normal diastolic function.    Right Ventricle: Normal right ventricular cavity size. Systolic function is normal.    Left Atrium: Left atrium is mildly dilated.    Pulmonary Artery: The estimated pulmonary artery systolic pressure is 5 mmHg.    IVC/SVC: Normal venous pressure at 3 mmHg.      Results for orders placed during the hospital encounter of 06/04/24    Nuclear Stress - Cardiology Interpreted    Interpretation Summary    Normal myocardial perfusion scan. There is no evidence of myocardial ischemia or infarction.    The gated perfusion images showed an ejection fraction of 65% at rest.    There is normal wall motion at rest and post stress.    The ECG portion of the study is negative for ischemia.    The patient reported chest pain during the stress test.    During stress, rare PVCs are noted.      No results found for this or any previous visit.          Lymphadenopathy             VTE Risk Mitigation (From admission, onward)           Ordered     IP VTE LOW RISK PATIENT  Once         06/04/24 3546                    Susu Calvert MD  Cardiology  [facility] Surg

## 2024-06-06 NOTE — SUBJECTIVE & OBJECTIVE
Interval History: no anginal sounding chest pains.  Stress test negative for ischemia    Review of Systems   Constitutional: Positive for malaise/fatigue.   HENT: Negative.     Eyes: Negative.    Respiratory: Negative.     Endocrine: Negative.    Hematologic/Lymphatic: Negative.    Skin: Negative.    Gastrointestinal: Negative.    Genitourinary: Negative.    Neurological: Negative.    Psychiatric/Behavioral: Negative.     Allergic/Immunologic: Negative.      Objective:     Vital Signs (Most Recent):  Temp: 98 °F (36.7 °C) (06/05/24 1114)  Pulse: 98 (06/05/24 1516)  Resp: 18 (06/05/24 1114)  BP: 133/81 (06/05/24 1114)  SpO2: 99 % (06/05/24 1114) Vital Signs (24h Range):  Temp:  [97.8 °F (36.6 °C)-98.2 °F (36.8 °C)] 98 °F (36.7 °C)  Pulse:  [63-98] 98  Resp:  [18-20] 18  SpO2:  [96 %-100 %] 99 %  BP: (127-137)/(81-85) 133/81     Weight: 72.6 kg (160 lb 0.9 oz)  Body mass index is 25.83 kg/m².     SpO2: 99 %         Intake/Output Summary (Last 24 hours) at 6/5/2024 2126  Last data filed at 6/5/2024 1308  Gross per 24 hour   Intake 120 ml   Output 2 ml   Net 118 ml       Lines/Drains/Airways       None                      Physical Exam  Constitutional:       Appearance: Normal appearance. She is well-developed.   HENT:      Head: Normocephalic.   Eyes:      Pupils: Pupils are equal, round, and reactive to light.   Cardiovascular:      Rate and Rhythm: Normal rate and regular rhythm.   Pulmonary:      Effort: Pulmonary effort is normal.      Breath sounds: Normal breath sounds.   Abdominal:      General: Bowel sounds are normal.      Palpations: Abdomen is soft.      Tenderness: There is no abdominal tenderness.   Musculoskeletal:         General: Normal range of motion.      Cervical back: Normal range of motion and neck supple.   Skin:     General: Skin is warm.   Neurological:      Mental Status: She is alert and oriented to person, place, and time.            Significant Labs:     DATA:     Laboratory:  CBC:  Recent  "Labs   Lab 06/04/24  0928   WBC 6.85   Hemoglobin 13.4   Hematocrit 41.4   Platelets 257       CHEMISTRIES:  Recent Labs   Lab 10/17/22  1119 06/05/24  0522   Glucose  --  96   Sodium 132 L 138   Potassium 3.5 3.9   BUN 16.6 12   Creatinine 0.78 0.8   eGFR African American 94  --    Calcium 9.5 9.6   Magnesium  --  2.0       CARDIAC BIOMARKERS:  Recent Labs   Lab 06/04/24  1544 06/04/24  2152 06/05/24 0522   Troponin I <0.006 <0.006 0.006       COAGS:  Recent Labs   Lab 06/04/24  0844 06/04/24  0928   INR 1.0 0.9       LIPIDS/LFTS:  Recent Labs   Lab 10/17/22  1119 06/05/24  0522   Cholesterol  --  245 H   Triglycerides  --  92   HDL  --  65   LDL Cholesterol  --  161.6 H   Non-HDL Cholesterol  --  180   AST 27 13   ALT 40 11       Hemoglobin A1C   Date Value Ref Range Status   06/05/2024 5.7 (H) 4.0 - 5.6 % Final     Comment:     ADA Screening Guidelines:  5.7-6.4%  Consistent with prediabetes  >or=6.5%  Consistent with diabetes    High levels of fetal hemoglobin interfere with the HbA1C  assay. Heterozygous hemoglobin variants (HbS, HgC, etc)do  not significantly interfere with this assay.   However, presence of multiple variants may affect accuracy.         TSH  Recent Labs   Lab 06/05/24 0522   TSH 1.426       The ASCVD Risk score (Fabio DK, et al., 2019) failed to calculate for the following reasons:    The patient has a prior MI or stroke diagnosis       BNP    No results found for: "BNP"         ECHO    Results for orders placed during the hospital encounter of 06/04/24    Echo    Interpretation Summary    Left Ventricle: Normal wall thickness. There is concentric remodeling. There is normal systolic function with a visually estimated ejection fraction of 60 - 65%. There is normal diastolic function.    Right Ventricle: Normal right ventricular cavity size. Systolic function is normal.    Left Atrium: Left atrium is mildly dilated.    Pulmonary Artery: The estimated pulmonary artery systolic pressure is 5 " mmHg.    IVC/SVC: Normal venous pressure at 3 mmHg.      STRESS TEST    Results for orders placed during the hospital encounter of 06/04/24    Nuclear Stress - Cardiology Interpreted    Interpretation Summary    Normal myocardial perfusion scan. There is no evidence of myocardial ischemia or infarction.    The gated perfusion images showed an ejection fraction of 65% at rest.    There is normal wall motion at rest and post stress.    The ECG portion of the study is negative for ischemia.    The patient reported chest pain during the stress test.    During stress, rare PVCs are noted.        CATH    No results found for this or any previous visit.

## 2024-06-06 NOTE — ASSESSMENT & PLAN NOTE
Atypical.   Stress test negative for ischemia.  No further cardiac workup indicated at this hospitalization.  Will sign off.  Follow-up in Cardiology    Results for orders placed or performed during the hospital encounter of 06/29/23   EKG 12-lead    Collection Time: 06/29/23  6:20 PM    Narrative    Test Reason : R07.9,    Vent. Rate : 089 BPM     Atrial Rate : 089 BPM     P-R Int : 152 ms          QRS Dur : 078 ms      QT Int : 364 ms       P-R-T Axes : 065 066 039 degrees     QTc Int : 442 ms    Normal sinus rhythm  Normal ECG  When compared with ECG of 16-MAR-2023 11:44,  Nonspecific T wave abnormality now evident in Anterior leads  Confirmed by Susu Calvert MD ([id number]) on 6/30/2023 12:43:34 AM    Referred By: [email or web address]   SELF           Confirmed By:Susu Calvert MD       Results for orders placed during the hospital encounter of 06/04/24    Echo    Interpretation Summary    Left Ventricle: Normal wall thickness. There is concentric remodeling. There is normal systolic function with a visually estimated ejection fraction of 60 - 65%. There is normal diastolic function.    Right Ventricle: Normal right ventricular cavity size. Systolic function is normal.    Left Atrium: Left atrium is mildly dilated.    Pulmonary Artery: The estimated pulmonary artery systolic pressure is 5 mmHg.    IVC/SVC: Normal venous pressure at 3 mmHg.      Results for orders placed during the hospital encounter of 06/04/24    Nuclear Stress - Cardiology Interpreted    Interpretation Summary    Normal myocardial perfusion scan. There is no evidence of myocardial ischemia or infarction.    The gated perfusion images showed an ejection fraction of 65% at rest.    There is normal wall motion at rest and post stress.    The ECG portion of the study is negative for ischemia.    The patient reported chest pain during the stress test.    During stress, rare PVCs are noted.      No results found for this or any previous visit.
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
## 2024-06-08 NOTE — HOSPITAL COURSE
Marylin Jane is a 49 y.o. female who has a past medical history of Asthma and Hypertension, presented to the ED with CC of Chest pain.  Patient reports that she has had left-sided neck and arm pains which began about 3 days ago and increase last night.  Chest pain was about 7/10 in worsening when she presented, although admits it is intermittent now and not painful currently.  Pain feels like pins and needles.  Patient has unusually large swelling at the base of her left jaw, at the angle.  Denies fever or feeling ill.  Does not have any other lymph nodes swollen.  Patient stated she felt like she was going to pass out when she presented to the ED. she thought she was having a stroke.  Her troponin was minimally elevated at 0.24 (on MROH scale this is elevated).  No ST elevations on EKG.  Cardiology consulted and stress test performed which was negative.  Patient stating swelling of her neck much improved the next day.  No stridor, difficulty with swallowing, difficulty with breathing.  Patient advised to follow up with ENT for re-evaluation.  Referral sent.  Patient endorsed understanding of plan and need for return if she developed any difficulty with breathing, trouble swallowing, or other symptoms of concern.  All questions answered prior to patient's discharge home to follow up with PCP within a week and ENT as scheduled

## 2024-08-23 ENCOUNTER — OFFICE VISIT (OUTPATIENT)
Dept: CARDIOLOGY | Facility: CLINIC | Age: 49
End: 2024-08-23
Payer: MEDICAID

## 2024-08-23 VITALS
HEART RATE: 100 BPM | HEIGHT: 66 IN | SYSTOLIC BLOOD PRESSURE: 110 MMHG | BODY MASS INDEX: 26.33 KG/M2 | OXYGEN SATURATION: 98 % | DIASTOLIC BLOOD PRESSURE: 70 MMHG | RESPIRATION RATE: 18 BRPM | WEIGHT: 163.81 LBS

## 2024-08-23 DIAGNOSIS — I21.4 NSTEMI (NON-ST ELEVATED MYOCARDIAL INFARCTION): ICD-10-CM

## 2024-08-23 DIAGNOSIS — R00.0 TACHYCARDIA: Primary | ICD-10-CM

## 2024-08-23 DIAGNOSIS — R00.2 HEART PALPITATIONS: ICD-10-CM

## 2024-08-23 PROCEDURE — 99999 PR PBB SHADOW E&M-EST. PATIENT-LVL IV: CPT | Mod: PBBFAC,,, | Performed by: INTERNAL MEDICINE

## 2024-08-23 PROCEDURE — 99214 OFFICE O/P EST MOD 30 MIN: CPT | Mod: PBBFAC | Performed by: INTERNAL MEDICINE

## 2024-08-23 RX ORDER — CARVEDILOL 6.25 MG/1
6.25 TABLET ORAL 2 TIMES DAILY
Qty: 180 TABLET | Refills: 3 | Status: SHIPPED | OUTPATIENT
Start: 2024-08-23

## 2024-08-23 NOTE — PROGRESS NOTES
CARDIOVASCULAR CONSULTATION    REASON FOR CONSULT:   Marylin Jane is a 49 y.o. female who presents for   Chief Complaint   Patient presents with    Hospital Follow Up        Referred by:  Marco Antonio Koroma Md  [address] [facility]  [place] [id number]      HISTORY OF PRESENT ILLNESS:     Patient is a pleasant 49-year-old lady.  Here because of persistent tachycardia.  States that her heart rate stays high and also she feels palpitations.  Denies orthopnea PND swelling of feet.  Denies any chest pains at rest on exertion    Results for orders placed or performed during the hospital encounter of 06/04/24   EKG 12-lead    Collection Time: 06/04/24 10:35 AM   Result Value Ref Range    QRS Duration 78 ms    [facility] QTC Calculation 450 ms    Narrative    Test Reason : R07.9,    Vent. Rate : 089 BPM     Atrial Rate : 089 BPM     P-R Int : 144 ms          QRS Dur : 078 ms      QT Int : 370 ms       P-R-T Axes : 072 065 051 degrees     QTc Int : 450 ms    Normal sinus rhythm  Normal ECG  When compared with ECG of 04-JUN-2024 08:40,  No significant change was found  Confirmed by Susu Calvert MD ([id number]) on 6/6/2024 12:07:53 AM    Referred By: [email or web address]   SELF           Confirmed By:Susu Calvert MD          ECHO    Results for orders placed during the hospital encounter of 06/04/24    Echo    Interpretation Summary    Left Ventricle: Normal wall thickness. There is concentric remodeling. There is normal systolic function with a visually estimated ejection fraction of 60 - 65%. There is normal diastolic function.    Right Ventricle: Normal right ventricular cavity size. Systolic function is normal.    Left Atrium: Left atrium is mildly dilated.    Pulmonary Artery: The estimated pulmonary artery systolic pressure is 5 mmHg.    IVC/SVC: Normal venous pressure at 3 mmHg.      STRESS TEST    Results for orders placed during the hospital encounter of 06/04/24    Nuclear Stress - Cardiology Interpreted    Interpretation Summary    " Normal myocardial perfusion scan. There is no evidence of myocardial ischemia or infarction.    The gated perfusion images showed an ejection fraction of 65% at rest.    There is normal wall motion at rest and post stress.    The ECG portion of the study is negative for ischemia.    The patient reported chest pain during the stress test.    During stress, rare PVCs are noted.        CATH    No results found for this or any previous visit.      PAST MEDICAL HISTORY:     Past Medical History:   Diagnosis Date    Asthma     Hypertension        PAST SURGICAL HISTORY:   History reviewed. No pertinent surgical history.        SOCIAL HISTORY:     Social History     Socioeconomic History    Marital status: Single   Tobacco Use    Smoking status: Every Day     Current packs/day: 1.00     Types: Cigarettes    Smokeless tobacco: Current   Substance and Sexual Activity    Alcohol use: Yes    Drug use: Not Currently    Sexual activity: Not Currently       FAMILY HISTORY:   No family history on file.    REVIEW OF SYSTEMS:   Review of Systems   Constitutional: Negative.   HENT: Negative.     Eyes: Negative.    Cardiovascular:  Positive for palpitations.   Respiratory: Negative.     Endocrine: Negative.    Hematologic/Lymphatic: Negative.    Skin: Negative.    Musculoskeletal: Negative.    Gastrointestinal: Negative.    Genitourinary: Negative.    Neurological: Negative.    Psychiatric/Behavioral: Negative.     Allergic/Immunologic: Negative.        A 10 point review of systems was performed and all the pertinent positives have been mentioned. Rest of review of systems was negative.        PHYSICAL EXAM:     Vitals:    08/23/24 1517   BP: 110/70   Pulse: 100   Resp: 18    Body mass index is 26.44 kg/m².  Weight: 74.3 kg (163 lb 12.8 oz)   Height: 5' 6" (167.6 cm)     Physical Exam  Constitutional:       Appearance: Normal appearance. She is well-developed.   HENT:      Head: Normocephalic.   Eyes:      Pupils: Pupils are equal, " round, and reactive to light.   Cardiovascular:      Rate and Rhythm: Normal rate and regular rhythm.   Pulmonary:      Effort: Pulmonary effort is normal.      Breath sounds: Normal breath sounds.   Abdominal:      General: Bowel sounds are normal.      Palpations: Abdomen is soft.      Tenderness: There is no abdominal tenderness.   Musculoskeletal:         General: Normal range of motion.      Cervical back: Normal range of motion and neck supple.   Skin:     General: Skin is warm.   Neurological:      Mental Status: She is alert and oriented to person, place, and time.           DATA:     Laboratory:  CBC:  Recent Labs   Lab 06/04/24  0928   WBC 6.85   Hemoglobin 13.4   Hematocrit 41.4   Platelets 257       CHEMISTRIES:  Recent Labs   Lab 10/17/22  1119 06/05/24  0522   Glucose  --  96   Sodium 132 L 138   Potassium 3.5 3.9   BUN 16.6 12   Creatinine 0.78 0.8   eGFR African American 94  --    Calcium 9.5 9.6   Magnesium  --  2.0       CARDIAC BIOMARKERS:  Recent Labs   Lab 06/04/24  1544 06/04/24  2152 06/05/24  0522   Troponin I <0.006 <0.006 0.006       COAGS:  Recent Labs   Lab 06/04/24  0844 06/04/24  0928   INR 1.0 0.9       LIPIDS/LFTS:  Recent Labs   Lab 10/17/22  1119 06/05/24  0522   Cholesterol  --  245 H   Triglycerides  --  92   HDL  --  65   LDL Cholesterol  --  161.6 H   Non-HDL Cholesterol  --  180   AST 27 13   ALT 40 11       Hemoglobin A1C   Date Value Ref Range Status   06/05/2024 5.7 (H) 4.0 - 5.6 % Final     Comment:     ADA Screening Guidelines:  5.7-6.4%  Consistent with prediabetes  >or=6.5%  Consistent with diabetes    High levels of fetal hemoglobin interfere with the HbA1C  assay. Heterozygous hemoglobin variants (HbS, HgC, etc)do  not significantly interfere with this assay.   However, presence of multiple variants may affect accuracy.         TSH  Recent Labs   Lab 06/05/24  0522   TSH 1.426       The ASCVD Risk score (Fabio RENE, et al., 2019) failed to calculate for the following  "reasons:    The patient has a prior MI or stroke diagnosis       BNP    No results found for: "BNP"      ASSESSMENT AND PLAN     Patient Active Problem List   Diagnosis    NSTEMI (non-ST elevated myocardial infarction)    Lymphadenopathy    Hyperglycemia    Chest pain         ALLERGIES AND MEDICATION:     Review of patient's allergies indicates:   Allergen Reactions    Aspirin Hives        Medication List            Accurate as of August 23, 2024  3:44 PM. If you have any questions, ask your nurse or doctor.                START taking these medications      carvediloL 6.25 MG tablet  Commonly known as: COREG  Take 1 tablet (6.25 mg total) by mouth 2 (two) times daily.  Started by: Susu Calvert MD            CONTINUE taking these medications      acetaminophen 500 MG tablet  Commonly known as: TYLENOL  Take 1 tablet (500 mg total) by mouth every 6 (six) hours as needed for Pain or Temperature greater than.     atorvastatin 40 MG tablet  Commonly known as: LIPITOR  Take 1 tablet (40 mg total) by mouth every evening.     cetirizine 10 MG tablet  Commonly known as: ZYRTEC  Take 1 tablet (10 mg total) by mouth daily as needed for Allergies or Rhinitis.     fluticasone propionate 50 mcg/actuation nasal spray  Commonly known as: FLONASE  1 spray (50 mcg total) by Each Nostril route 2 (two) times daily as needed for Rhinitis or Allergies.     promethazine-dextromethorphan 6.25-15 mg/5 mL Syrp  Commonly known as: PROMETHAZINE-DM  Take 5 mLs by mouth every 4 (four) hours as needed (cough/congestion).            STOP taking these medications      amLODIPine 10 MG tablet  Commonly known as: NORVASC  Stopped by: Susu Calvert MD     benzonatate 100 MG capsule  Commonly known as: TESSALON  Stopped by: Susu Calvert MD               Where to Get Your Medications        These medications were sent to [facility] - [place] - [address]  [address] [place] [address]      Phone: " [phone]   carvediloL 6.25 MG tablet         Orders Placed This Encounter   Procedures    Holter monitor - 48 hour       Heart palpitations:  Switch from Norvasc to carvedilol.  Tartrate carvedilol as needed    Check Holter    Follow-up after testing      Thank you very much for involving me in the care of your patient.  Please do not hesitate to contact me if there are any questions.      Susu Calvert MD, FAC, [facility]  Interventional Cardiologist, [facility].           This note was dictated with the help of speech recognition software.  There might be un-intended errors and/or substitutions.

## 2024-08-28 ENCOUNTER — HOSPITAL ENCOUNTER (OUTPATIENT)
Dept: CARDIOLOGY | Facility: HOSPITAL | Age: 49
Discharge: HOME | End: 2024-08-28
Attending: INTERNAL MEDICINE
Payer: MEDICAID

## 2024-08-28 DIAGNOSIS — R00.0 TACHYCARDIA: ICD-10-CM

## 2024-08-28 PROCEDURE — 93225 XTRNL ECG REC<48 HRS REC: CPT

## 2024-08-28 PROCEDURE — 93226 XTRNL ECG REC<48 HR SCAN A/R: CPT

## 2024-09-09 PROBLEM — I21.4 NSTEMI (NON-ST ELEVATED MYOCARDIAL INFARCTION): Status: RESOLVED | Noted: 2024-06-04 | Resolved: 2024-09-09

## 2024-10-02 ENCOUNTER — OFFICE VISIT (OUTPATIENT)
Dept: CARDIOLOGY | Facility: CLINIC | Age: 49
End: 2024-10-02
Payer: MEDICAID

## 2024-10-02 VITALS
DIASTOLIC BLOOD PRESSURE: 82 MMHG | WEIGHT: 166.56 LBS | BODY MASS INDEX: 28.44 KG/M2 | HEART RATE: 106 BPM | OXYGEN SATURATION: 96 % | HEIGHT: 64 IN | SYSTOLIC BLOOD PRESSURE: 130 MMHG | RESPIRATION RATE: 15 BRPM

## 2024-10-02 DIAGNOSIS — F17.200 SMOKING: Primary | ICD-10-CM

## 2024-10-02 DIAGNOSIS — R00.2 HEART PALPITATIONS: ICD-10-CM

## 2024-10-02 DIAGNOSIS — E78.5 DYSLIPIDEMIA: ICD-10-CM

## 2024-10-02 DIAGNOSIS — R00.0 TACHYCARDIA: ICD-10-CM

## 2024-10-02 PROCEDURE — 99214 OFFICE O/P EST MOD 30 MIN: CPT | Mod: S$PBB,,, | Performed by: INTERNAL MEDICINE

## 2024-10-02 PROCEDURE — 3008F BODY MASS INDEX DOCD: CPT | Mod: CPTII,,, | Performed by: INTERNAL MEDICINE

## 2024-10-02 PROCEDURE — 3044F HG A1C LEVEL LT 7.0%: CPT | Mod: CPTII,,, | Performed by: INTERNAL MEDICINE

## 2024-10-02 PROCEDURE — 3079F DIAST BP 80-89 MM HG: CPT | Mod: CPTII,,, | Performed by: INTERNAL MEDICINE

## 2024-10-02 PROCEDURE — 99214 OFFICE O/P EST MOD 30 MIN: CPT | Mod: PBBFAC | Performed by: INTERNAL MEDICINE

## 2024-10-02 PROCEDURE — 1159F MED LIST DOCD IN RCRD: CPT | Mod: CPTII,,, | Performed by: INTERNAL MEDICINE

## 2024-10-02 PROCEDURE — 3075F SYST BP GE 130 - 139MM HG: CPT | Mod: CPTII,,, | Performed by: INTERNAL MEDICINE

## 2024-10-02 PROCEDURE — 99999 PR PBB SHADOW E&M-EST. PATIENT-LVL IV: CPT | Mod: PBBFAC,,, | Performed by: INTERNAL MEDICINE

## 2024-10-02 NOTE — PROGRESS NOTES
CARDIOVASCULAR CONSULTATION    REASON FOR CONSULT:   Marylin Jane is a 49 y.o. female who presents for   Chief Complaint   Patient presents with    Results        Referred by:  No referring provider defined for this encounter.      HISTORY OF PRESENT ILLNESS:     Patient is a pleasant 49-year-old lady.  Here because of persistent tachycardia.  States that her heart rate stays high and also she feels palpitations.  Denies orthopnea PND swelling of feet.  Denies any chest pains at rest on exertion    Results for orders placed or performed during the hospital encounter of 06/04/24   EKG 12-lead    Collection Time: 06/04/24 10:35 AM   Result Value Ref Range    QRS Duration 78 ms    [facility] QTC Calculation 450 ms    Narrative    Test Reason : R07.9,    Vent. Rate : 089 BPM     Atrial Rate : 089 BPM     P-R Int : 144 ms          QRS Dur : 078 ms      QT Int : 370 ms       P-R-T Axes : 072 065 051 degrees     QTc Int : 450 ms    Normal sinus rhythm  Normal ECG  When compared with ECG of 04-JUN-2024 08:40,  No significant change was found  Confirmed by Susu Calvert MD ([id number]) on 6/6/2024 12:07:53 AM    Referred By: [email or web address]   SELF           Confirmed By:Susu Calvert MD          ECHO    Results for orders placed during the hospital encounter of 06/04/24    Echo    Interpretation Summary    Left Ventricle: Normal wall thickness. There is concentric remodeling. There is normal systolic function with a visually estimated ejection fraction of 60 - 65%. There is normal diastolic function.    Right Ventricle: Normal right ventricular cavity size. Systolic function is normal.    Left Atrium: Left atrium is mildly dilated.    Pulmonary Artery: The estimated pulmonary artery systolic pressure is 5 mmHg.    IVC/SVC: Normal venous pressure at 3 mmHg.      STRESS TEST    Results for orders placed during the hospital encounter of 06/04/24    Nuclear Stress - Cardiology Interpreted    Interpretation Summary    Normal myocardial  "perfusion scan. There is no evidence of myocardial ischemia or infarction.    The gated perfusion images showed an ejection fraction of 65% at rest.    There is normal wall motion at rest and post stress.    The ECG portion of the study is negative for ischemia.    The patient reported chest pain during the stress test.    During stress, rare PVCs are noted.    Notes from October 24: Patient here for follow-up.  Denies chest pains at rest on exertion, orthopnea, PND.  Unfortunately continues to smoke.  Holter did not show any significant arrhythmias    PAST MEDICAL HISTORY:     Past Medical History:   Diagnosis Date    Asthma     Hypertension        PAST SURGICAL HISTORY:   No past surgical history on file.        SOCIAL HISTORY:     Social History     Socioeconomic History    Marital status: Single   Tobacco Use    Smoking status: Every Day     Current packs/day: 1.00     Types: Cigarettes    Smokeless tobacco: Current   Substance and Sexual Activity    Alcohol use: Yes    Drug use: Not Currently    Sexual activity: Not Currently       FAMILY HISTORY:   No family history on file.    REVIEW OF SYSTEMS:   Review of Systems   Constitutional: Negative.   HENT: Negative.     Eyes: Negative.    Respiratory: Negative.     Endocrine: Negative.    Hematologic/Lymphatic: Negative.    Skin: Negative.    Musculoskeletal: Negative.    Gastrointestinal: Negative.    Genitourinary: Negative.    Neurological: Negative.    Psychiatric/Behavioral: Negative.     Allergic/Immunologic: Negative.        A 10 point review of systems was performed and all the pertinent positives have been mentioned. Rest of review of systems was negative.        PHYSICAL EXAM:     Vitals:    10/02/24 1507   BP: 130/82   Pulse: 106   Resp: 15    Body mass index is 28.59 kg/m².  Weight: 75.5 kg (166 lb 8.9 oz)   Height: 5' 4" (162.6 cm)     Physical Exam  Constitutional:       Appearance: Normal appearance. She is well-developed.   HENT:      Head: " Normocephalic.   Eyes:      Pupils: Pupils are equal, round, and reactive to light.   Cardiovascular:      Rate and Rhythm: Normal rate and regular rhythm.   Pulmonary:      Effort: Pulmonary effort is normal.      Breath sounds: Normal breath sounds.   Abdominal:      General: Bowel sounds are normal.      Palpations: Abdomen is soft.      Tenderness: There is no abdominal tenderness.   Musculoskeletal:         General: Normal range of motion.      Cervical back: Normal range of motion and neck supple.   Skin:     General: Skin is warm.   Neurological:      Mental Status: She is alert and oriented to person, place, and time.           DATA:     Laboratory:  CBC:  Recent Labs   Lab 06/04/24  0928   WBC 6.85   Hemoglobin 13.4   Hematocrit 41.4   Platelets 257       CHEMISTRIES:  Recent Labs   Lab 10/17/22  1119 06/05/24  0522   Glucose  --  96   Sodium 132 L 138   Potassium 3.5 3.9   BUN 16.6 12   Creatinine 0.78 0.8   eGFR African American 94  --    Calcium 9.5 9.6   Magnesium  --  2.0       CARDIAC BIOMARKERS:  Recent Labs   Lab 06/04/24  1544 06/04/24  2152 06/05/24 0522   Troponin I <0.006 <0.006 0.006       COAGS:  Recent Labs   Lab 06/04/24  0844 06/04/24  0928   INR 1.0 0.9       LIPIDS/LFTS:  Recent Labs   Lab 10/17/22  1119 06/05/24  0522   Cholesterol  --  245 H   Triglycerides  --  92   HDL  --  65   LDL Cholesterol  --  161.6 H   Non-HDL Cholesterol  --  180   AST 27 13   ALT 40 11       Hemoglobin A1C   Date Value Ref Range Status   06/05/2024 5.7 (H) 4.0 - 5.6 % Final     Comment:     ADA Screening Guidelines:  5.7-6.4%  Consistent with prediabetes  >or=6.5%  Consistent with diabetes    High levels of fetal hemoglobin interfere with the HbA1C  assay. Heterozygous hemoglobin variants (HbS, HgC, etc)do  not significantly interfere with this assay.   However, presence of multiple variants may affect accuracy.         TSH  Recent Labs   Lab 06/05/24  0522   TSH 1.426       The ASCVD Risk score (Fabio RENE,  "et al., 2019) failed to calculate for the following reasons:    Risk score cannot be calculated because patient has a medical history suggesting prior/existing ASCVD       BNP    No results found for: "BNP"      ASSESSMENT AND PLAN     Patient Active Problem List   Diagnosis    Lymphadenopathy    Hyperglycemia    Chest pain         ALLERGIES AND MEDICATION:     Review of patient's allergies indicates:   Allergen Reactions    Aspirin Hives        Medication List            Accurate as of October 2, 2024  3:27 PM. If you have any questions, ask your nurse or doctor.                CONTINUE taking these medications      acetaminophen 500 MG tablet  Commonly known as: TYLENOL  Take 1 tablet (500 mg total) by mouth every 6 (six) hours as needed for Pain or Temperature greater than.     atorvastatin 40 MG tablet  Commonly known as: LIPITOR  Take 1 tablet (40 mg total) by mouth every evening.     carvediloL 6.25 MG tablet  Commonly known as: COREG  Take 1 tablet (6.25 mg total) by mouth 2 (two) times daily.     cetirizine 10 MG tablet  Commonly known as: ZYRTEC  Take 1 tablet (10 mg total) by mouth daily as needed for Allergies or Rhinitis.     fluticasone propionate 50 mcg/actuation nasal spray  Commonly known as: FLONASE  1 spray (50 mcg total) by Each Nostril route 2 (two) times daily as needed for Rhinitis or Allergies.     promethazine-dextromethorphan 6.25-15 mg/5 mL Syrp  Commonly known as: PROMETHAZINE-DM  Take 5 mLs by mouth every 4 (four) hours as needed (cough/congestion).              Orders Placed This Encounter   Procedures    Lipid Panel    HEPATIC FUNCTION PANEL    Ambulatory referral/consult to Smoking Cessation Program       Heart palpitations:  No arrhythmia seen on Holter.  Better after starting Coreg    Smoking cessation has been recommended.    Follow-up after 6 months    Thank you very much for involving me in the care of your patient.  Please do not hesitate to contact me if there are any " questions.      Susu Calvert MD, FAC, [facility]  Interventional Cardiologist, [facility].           This note was dictated with the help of speech recognition software.  There might be un-intended errors and/or substitutions.

## 2024-10-12 ENCOUNTER — HOSPITAL ENCOUNTER (EMERGENCY)
Facility: HOSPITAL | Age: 49
Discharge: HOME | End: 2024-10-13
Attending: EMERGENCY MEDICINE
Payer: MEDICAID

## 2024-10-12 DIAGNOSIS — R79.89 ELEVATED LIVER FUNCTION TESTS: Primary | ICD-10-CM

## 2024-10-12 DIAGNOSIS — R10.10 UPPER ABDOMINAL PAIN: ICD-10-CM

## 2024-10-12 DIAGNOSIS — R10.11 RIGHT UPPER QUADRANT ABDOMINAL PAIN: ICD-10-CM

## 2024-10-12 LAB
ALBUMIN SERPL-MCNC: 3.5 G/DL (ref 3.3–5.5)
ALBUMIN SERPL-MCNC: 3.6 G/DL (ref 3.3–5.5)
ALLENS TEST: ABNORMAL
ALP SERPL-CCNC: 171 U/L (ref 42–141)
ALP SERPL-CCNC: 184 U/L (ref 42–141)
BILIRUB SERPL-MCNC: 0.6 MG/DL (ref 0.2–1.6)
BILIRUB SERPL-MCNC: 0.7 MG/DL (ref 0.2–1.6)
BILIRUBIN, POC UA: NEGATIVE
BLOOD, POC UA: NEGATIVE
BUN SERPL-MCNC: 12 MG/DL (ref 7–22)
CALCIUM SERPL-MCNC: 9.9 MG/DL (ref 8–10.3)
CHLORIDE SERPL-SCNC: 106 MMOL/L (ref 98–108)
CLARITY, UA: CLEAR
COLOR, UA: YELLOW
CREAT SERPL-MCNC: 0.7 MG/DL (ref 0.6–1.2)
GLUCOSE SERPL-MCNC: 109 MG/DL (ref 73–118)
GLUCOSE, POC UA: NEGATIVE
HCO3 UR-SCNC: 22.6 MMOL/L (ref 24–28)
HCT, POC: NORMAL
HGB, POC: NORMAL (ref 14–18)
KETONES, POC UA: ABNORMAL
LDH SERPL L TO P-CCNC: 0.69 MMOL/L (ref 0.5–2.2)
LEUKOCYTE EST, POC UA: NEGATIVE
LIPASE SERPL-CCNC: 26 U/L (ref 4–60)
MCH, POC: NORMAL
MCHC, POC: NORMAL
MCV, POC: NORMAL
MPV, POC: NORMAL
NITRITE, POC UA: NEGATIVE
PCO2 BLDA: 40.6 MMHG (ref 35–45)
PH SMN: 7.35 [PH] (ref 7.35–7.45)
PH UR STRIP: 6 [PH] (ref 5–8)
PO2 BLDA: 34 MMHG (ref 40–60)
POC ALT (SGPT): 359 U/L (ref 10–47)
POC ALT (SGPT): 360 U/L (ref 10–47)
POC AMYLASE: 71 U/L (ref 14–97)
POC AST (SGOT): 735 U/L (ref 11–38)
POC AST (SGOT): 749 U/L (ref 11–38)
POC B-TYPE NATRIURETIC PEPTIDE: 7.9 PG/ML (ref 0–100)
POC BE: -3 MMOL/L
POC CARDIAC TROPONIN I: 0 NG/ML (ref 0–0.08)
POC GGT: 625 U/L (ref 5–65)
POC PLATELET COUNT: NORMAL
POC SATURATED O2: 63 % (ref 95–100)
POC TCO2: 24 MMOL/L (ref 24–29)
POC TCO2: 25 MMOL/L (ref 18–33)
POTASSIUM BLD-SCNC: 3.9 MMOL/L (ref 3.6–5.1)
PROTEIN, POC UA: ABNORMAL
PROTEIN, POC: 7.6 G/DL (ref 6.4–8.1)
PROTEIN, POC: 7.7 G/DL (ref 6.4–8.1)
RBC, POC: NORMAL
RDW, POC: NORMAL
SAMPLE: ABNORMAL
SAMPLE: NORMAL
SITE: ABNORMAL
SODIUM BLD-SCNC: 140 MMOL/L (ref 128–145)
SPECIFIC GRAVITY, POC UA: >=1.03 (ref 1–1.03)
UROBILINOGEN, POC UA: 1 E.U./DL
WBC, POC: NORMAL

## 2024-10-12 PROCEDURE — 83690 ASSAY OF LIPASE: CPT | Performed by: EMERGENCY MEDICINE

## 2024-10-12 PROCEDURE — 25500020 PHARM REV CODE 255: Mod: ER | Performed by: EMERGENCY MEDICINE

## 2024-10-12 PROCEDURE — 82803 BLOOD GASES ANY COMBINATION: CPT | Mod: ER

## 2024-10-12 PROCEDURE — 63600175 PHARM REV CODE 636 W HCPCS: Mod: ER | Performed by: EMERGENCY MEDICINE

## 2024-10-12 PROCEDURE — 93010 ELECTROCARDIOGRAM REPORT: CPT | Mod: ,,, | Performed by: INTERNAL MEDICINE

## 2024-10-12 PROCEDURE — 99285 EMERGENCY DEPT VISIT HI MDM: CPT | Mod: 25,ER

## 2024-10-12 PROCEDURE — 25000003 PHARM REV CODE 250: Mod: ER | Performed by: EMERGENCY MEDICINE

## 2024-10-12 PROCEDURE — 96374 THER/PROPH/DIAG INJ IV PUSH: CPT | Mod: 59,ER

## 2024-10-12 PROCEDURE — 99900035 HC TECH TIME PER 15 MIN (STAT): Mod: ER

## 2024-10-12 PROCEDURE — 93005 ELECTROCARDIOGRAM TRACING: CPT | Mod: ER

## 2024-10-12 RX ORDER — ONDANSETRON HYDROCHLORIDE 2 MG/ML
4 INJECTION, SOLUTION INTRAVENOUS
Status: COMPLETED | OUTPATIENT
Start: 2024-10-12 | End: 2024-10-12

## 2024-10-12 RX ORDER — ACETAMINOPHEN 500 MG
1000 TABLET ORAL
Status: COMPLETED | OUTPATIENT
Start: 2024-10-12 | End: 2024-10-12

## 2024-10-12 RX ORDER — MONTELUKAST SODIUM 10 MG/1
10 TABLET ORAL NIGHTLY
COMMUNITY

## 2024-10-12 RX ORDER — PANTOPRAZOLE SODIUM 40 MG/1
40 TABLET, DELAYED RELEASE ORAL DAILY
COMMUNITY

## 2024-10-12 RX ADMIN — IOHEXOL 75 ML: 350 INJECTION, SOLUTION INTRAVENOUS at 10:10

## 2024-10-12 RX ADMIN — ONDANSETRON 4 MG: 2 INJECTION INTRAMUSCULAR; INTRAVENOUS at 10:10

## 2024-10-12 RX ADMIN — ACETAMINOPHEN 1000 MG: 500 TABLET, FILM COATED ORAL at 09:10

## 2024-10-13 VITALS
TEMPERATURE: 99 F | HEIGHT: 64 IN | SYSTOLIC BLOOD PRESSURE: 128 MMHG | HEART RATE: 83 BPM | RESPIRATION RATE: 20 BRPM | BODY MASS INDEX: 28.34 KG/M2 | OXYGEN SATURATION: 98 % | WEIGHT: 166 LBS | DIASTOLIC BLOOD PRESSURE: 79 MMHG

## 2024-10-13 PROCEDURE — 25000003 PHARM REV CODE 250: Mod: ER | Performed by: EMERGENCY MEDICINE

## 2024-10-13 RX ORDER — DICYCLOMINE HYDROCHLORIDE 20 MG/1
20 TABLET ORAL
Qty: 30 TABLET | Refills: 0 | Status: SHIPPED | OUTPATIENT
Start: 2024-10-13 | End: 2024-11-12

## 2024-10-13 RX ORDER — ALUMINUM HYDROXIDE, MAGNESIUM HYDROXIDE, AND SIMETHICONE 1200; 120; 1200 MG/30ML; MG/30ML; MG/30ML
30 SUSPENSION ORAL
Status: COMPLETED | OUTPATIENT
Start: 2024-10-13 | End: 2024-10-13

## 2024-10-13 RX ORDER — SYRING-NEEDL,DISP,INSUL,0.3 ML 29 G X1/2"
296 SYRINGE, EMPTY DISPOSABLE MISCELLANEOUS ONCE
Qty: 296 ML | Refills: 0 | Status: SHIPPED | OUTPATIENT
Start: 2024-10-13 | End: 2024-10-13

## 2024-10-13 RX ADMIN — ALUMINUM HYDROXIDE, MAGNESIUM HYDROXIDE, AND SIMETHICONE 30 ML: 1200; 120; 1200 SUSPENSION ORAL at 02:10

## 2024-10-13 NOTE — ED PROVIDER NOTES
Encounter Date: 10/12/2024       History     Chief Complaint   Patient presents with    Abdominal Pain     Pt c/o upper abd pain since yesterday with n/v     49 y.o. sunitha with HTN, asthma, tobacco abuse, marijuana use and others presents emergency department complaining of acute, constant, sharp, bilateral upper abdominal pain , fatigue, lightheadedness with standing, nausea and vomiting that began last night.  She reports three episodes of  emesis yesterday and two episodes of emesis this morning (nonbloody, nonbilious, non coffee-ground-appearing).  She endorses chronic constipation and reports scant bowel movements over the last 3-4 months.  She reports taking Linzess daily and reports giving herself an enema last night.  She also reports taking Tylenol for symptoms without improvement.  She reports exacerbation of abdominal pain after eating but denies appetite change.  She reports having an umbilical hernia (chronically painful to touch) but denies prior abdominal surgery.  Last oral intake 3:00 p.m. today.  Last bowel movement upon ED arrival, soft, scant.  She denies bright red blood per rectum or melena.  She endorses alcohol use every other day or so but states her last alcohol consumption was three days ago.  She denies shortness of breath and chest pain and indicates her asthma is controlled with Singulair and Zyrtec nightly.  She reports being seen by her primary care doctor yesterday for routine visit reports having a negative COVID and negative flu test at that time.  She reports primary care doctor discontinued amlodipine yesterday since patient was prescribed carvedilol 6.25 mg b.i.d. by her cardiologist due to reported episodes of of elevated heart rate.  Patient states she has been instructed that she may take 1-2 tablets twice daily as needed for elevated heart rate.    Patient reports having aspirin allergy and indicates that she avoids all over-the-counter NSAIDs usually only takes Tylenol for  pain.    The history is provided by the patient.     Review of patient's allergies indicates:   Allergen Reactions    Aspirin Hives     Past Medical History:   Diagnosis Date    Asthma     Hypertension      History reviewed. No pertinent surgical history.  No family history on file.  Social History     Tobacco Use    Smoking status: Every Day     Current packs/day: 1.00     Types: Cigarettes    Smokeless tobacco: Current   Substance Use Topics    Alcohol use: Yes    Drug use: Not Currently     Review of Systems   Constitutional:  Negative for appetite change and fever.   Respiratory:  Negative for cough and shortness of breath.    Cardiovascular:  Negative for chest pain.   Gastrointestinal:  Positive for abdominal pain, constipation, nausea and vomiting. Negative for diarrhea.   Genitourinary:  Negative for dysuria, frequency, hematuria and vaginal discharge.   Musculoskeletal:  Positive for back pain (chronic low back pain).   Neurological:  Positive for light-headedness. Negative for dizziness, syncope and headaches.       Physical Exam     Initial Vitals [10/12/24 1956]   BP Pulse Resp Temp SpO2   133/88 103 20 99.6 °F (37.6 °C) 97 %      MAP       --         Physical Exam    Nursing note and vitals reviewed.  Constitutional: She appears well-developed and well-nourished. She is not diaphoretic. She is cooperative.  Non-toxic appearance. No distress.   HENT:   Head: Normocephalic and atraumatic.   Eyes: Conjunctivae are normal. Pupils are equal, round, and reactive to light.   Neck: Neck supple.   Normal range of motion.  Cardiovascular:  Normal rate and intact distal pulses.           Pulmonary/Chest: No accessory muscle usage. No tachypnea. No respiratory distress.   Abdominal: Abdomen is soft and protuberant. Bowel sounds are normal. She exhibits distension. There is abdominal tenderness in the right upper quadrant, epigastric area, periumbilical area and left upper quadrant. A hernia is present. Hernia  confirmed positive in the umbilical area.   No right CVA tenderness.  No left CVA tenderness. There is no rebound, no guarding and negative Sanchez's sign.   Musculoskeletal:         General: No tenderness. Normal range of motion.      Cervical back: Normal range of motion and neck supple.     Neurological: She is alert and oriented to person, place, and time. She has normal strength. Gait normal. GCS eye subscore is 4. GCS verbal subscore is 5. GCS motor subscore is 6.   Skin: Skin is warm. Capillary refill takes less than 2 seconds.   Psychiatric: She has a normal mood and affect.         ED Course   Procedures  Labs Reviewed   POCT URINALYSIS W/O SCOPE - Abnormal       Result Value    Glucose, UA Negative      Bilirubin, UA Negative      Ketones, UA Trace (*)     Spec Grav UA >=1.030 (*)     Blood, UA Negative      PH, UA 6.0      Protein, UA 1+ (*)     Urobilinogen, UA 1.0      Nitrite, UA Negative      Leukocytes, UA Negative      Color, UA POC Yellow      Clarity, UA, POC Clear     ISTAT PROCEDURE - Abnormal    POC PH 7.354      POC PCO2 40.6      POC PO2 34 (*)     POC HCO3 22.6 (*)     POC BE -3 (*)     POC SATURATED O2 63      POC Lactate 0.69      POC TCO2 24      Sample VENOUS      Site Other      Allens Test N/A     POCT LIVER PANEL - Abnormal    Albumin, POC 3.6      Alkaline Phosphatase,  (*)     ALT (SGPT),  (*)     Amylase, POC 71      AST (SGOT),  (*)     POC  (*)     Bilirubin, POC 0.6      Protein, POC 7.6     POCT CMP - Abnormal    Albumin, POC 3.5      Alkaline Phosphatase,  (*)     ALT (SGPT),  (*)     AST (SGOT),  (*)     POC BUN 12      Calcium, POC 9.9      POC Chloride 106      POC Creatinine 0.7      POC Glucose 109      POC Potassium 3.9      POC Sodium 140      Bilirubin, POC 0.7      POC TCO2 25      Protein, POC 7.7     LIPASE    Lipase 26     TROPONIN ISTAT    POC Cardiac Troponin I 0.00      Sample unknown     POCT URINALYSIS W/O SCOPE    POCT CBC    Hematocrit        Hemoglobin        RBC        WBC        MCV        MCH, POC        MCHC        RDW-CV        Platelet Count, POC        MPV       POCT CMP   POCT LIVER PANEL   POCT TROPONIN   POCT B-TYPE NATRIURETIC PEPTIDE (BNP)   POCT B-TYPE NATRIURETIC PEPTIDE (BNP)    POC B-Type Natriuretic Peptide 7.9       EKG Readings: (Independently Interpreted)   Initial Reading: No STEMI. Rhythm: Normal Sinus Rhythm. Heart Rate: 87. Ectopy: No Ectopy. Conduction: Normal. ST Segments: Normal ST Segments. T Waves: Normal. Axis: Normal. Clinical Impression: Normal Sinus Rhythm     ECG Results              EKG 12-lead (Final result)        Collection Time Result Time QRS Duration [facility] QTC Calculation    10/12/24 22:13:58 10/14/24 07:30:06 78 433                     Final result by Interface, Lab In [facility] (10/14/24 07:30:10)                   Narrative:    Test Reason : R10.10,    Vent. Rate : 087 BPM     Atrial Rate : 087 BPM     P-R Int : 146 ms          QRS Dur : 078 ms      QT Int : 360 ms       P-R-T Axes : 080 080 066 degrees     QTc Int : 433 ms    Normal sinus rhythm  Normal ECG  When compared with ECG of 05-JUN-2024 08:20,  Previous ECG has undetermined rhythm, needs review  Confirmed by Killian BAH, Eddy FERRERA ([id number]) on 10/14/2024 7:30:03 AM    Referred By: [email or web address]   SELF           Confirmed By:Eddy Daily MD                                  Imaging Results              US Abdomen Complete (Final result)  Result time 10/13/24 01:38:46      Final result by Rachel Denis MD (10/13/24 01:38:46)                   Impression:      No cholelithiasis or evidence of acute cholecystitis.    Questionable mild hepatic steatosis.      Electronically signed by: Rachel Denis  Date:    10/13/2024  Time:    01:38               Narrative:    EXAMINATION:  ULTRASOUND OF THE ABDOMEN    CLINICAL HISTORY:  Right upper quadrant pain    TECHNIQUE:  Real-time abdominal ultrasound was performed.    COMPARISON:  CT  10/12/2024    FINDINGS:  The liver is normal in size and questionably mild increase in echogenicity without evidence of hepatomegaly or focal lesions.    The partially contracted gallbladder contains no calcified gallstones.  Gallbladder wall is not thickened and measures 1.9 mm.    The visualized pancreas is unremarkable.    The common bile duct measures 5.1 mm.    Both kidneys are normal in size without hydronephrosis. The right kidney measures 12.1 cm in length. The left kidney measures 12.1 cm in length.    The spleen is normal in size, measuring 8.4 x 3.7 cm.    The IVC is unremarkable.  The abdominal aorta is partially obscured by overlying bowel gas.                                       CT Abdomen Pelvis With IV Contrast NO Oral Contrast (Final result)  Result time 10/13/24 00:10:35      Final result by Garrett Vizcarra MD (10/13/24 00:10:35)                   Impression:      No acute findings.  No bowel obstruction.    Additional findings, as above, similar compared to prior.      Electronically signed by: Garrett Vizcarra MD  Date:    10/13/2024  Time:    00:10               Narrative:    EXAMINATION:  CT ABDOMEN PELVIS WITH IV CONTRAST    CLINICAL HISTORY:  Bowel obstruction suspected;Epigastric pain;    TECHNIQUE:  Low dose axial images, sagittal and coronal reformations were obtained from the lung bases to the pubic symphysis following the IV administration of 75 mL of Omnipaque 350 .  Oral contrast was not administered.    COMPARISON:  03/16/2023.    FINDINGS:  Abdomen:    - Lower thorax:Unremarkable.    - Lung bases: No infiltrates and no nodules.    - Liver: No focal mass.    - Gallbladder: No calcified gallstones.    - Bile Ducts: No evidence of intra or extra hepatic biliary ductal dilation.    - Spleen: Negative.    - Kidneys: Bilateral nonobstructing punctate calculi, similar to prior.  No hydronephrosis.    - Adrenals: Unremarkable.    - Pancreas: No mass or peripancreatic fat stranding.    -  Retroperitoneum:  No significant adenopathy.    - Vascular: No abdominal aortic aneurysm.    - Abdominal wall:  Fat containing umbilical hernia, similar to prior.    Pelvis:    No pelvic mass, adenopathy, or free fluid.  Uterus is absent.  Bilateral clips in the anterior pelvis appears similar to prior.    Bowel/Mesentery:    No evidence of bowel obstruction or inflammation.  Normal appendix.    Bones:  L1 compression fracture, unchanged from prior.                                       Medications   ondansetron injection 4 mg (4 mg Intravenous Given 10/12/24 2210)   acetaminophen tablet 1,000 mg (1,000 mg Oral Given 10/12/24 2144)   iohexoL (OMNIPAQUE 350) injection 75 mL (75 mLs Intravenous Given 10/12/24 2259)   aluminum-magnesium hydroxide-simethicone 200-200-20 mg/5 mL suspension 30 mL (30 mLs Oral Given 10/13/24 0204)     Medical Decision Making                   Chart Review   Latest Reference Range & Units 06/05/24 05:22   ALP 55 - 135 U/L 41 (L)   PROTEIN TOTAL 6.0 - 8.4 g/dL 7.0   Albumin 3.5 - 5.2 g/dL 3.5   BILIRUBIN TOTAL 0.1 - 1.0 mg/dL 0.3   AST 10 - 40 U/L 13   ALT 10 - 44 U/L 11   (L): Data is abnormally low    Labs Reviewed  Pertinent lab and imaging findings include:   There is no leukocytosis, H&H within normal limits, BNP within normal limits, alk-phos 184, , , T bili 0.7, amylase 71, lactic acid 0.69, troponin 0.00, lipase 26, urinalysis with trace ketonuria, elevated urine specific gravity, 1+ proteinuria, negative nitrites, negative leukocyte esterase, CT abdomen and pelvis negative for acute abnormality, abdominal ultrasound negative for acute abnormality    Admission on 10/12/2024, Discharged on 10/13/2024   Component Date Value Ref Range Status    Glucose, UA 10/12/2024 Negative  Negative Final    Bilirubin, UA 10/12/2024 Negative  Negative Final    Ketones, UA 10/12/2024 Trace (A)  Negative Final    Spec Grav UA 10/12/2024 >=1.030 (>)  1.005 - 1.030 Final    Blood, UA  10/12/2024 Negative  Negative Final    PH, UA 10/12/2024 6.0  5.0 - 8.0 Final    Protein, UA 10/12/2024 1+ (A)  Negative Final    Urobilinogen, UA 10/12/2024 1.0  <=1.0 E.U./dL Final    Nitrite, UA 10/12/2024 Negative  Negative Final    Leukocytes, UA 10/12/2024 Negative  Negative Final    Color, UA POC 10/12/2024 Yellow  Yellow, Straw, Madeline Final    Clarity, UA, POC 10/12/2024 Clear  Clear Final    Lipase 10/12/2024 26  4 - 60 U/L Final    QRS Duration 10/12/2024 78  ms Final    OHS QTC Calculation 10/12/2024 433  ms Final    POC PH 10/12/2024 7.354  7.35 - 7.45 Final    POC PCO2 10/12/2024 40.6  35 - 45 mmHg Final    POC PO2 10/12/2024 34 (LL)  40 - 60 mmHg Final    POC HCO3 10/12/2024 22.6 (L)  24 - 28 mmol/L Final    POC BE 10/12/2024 -3 (L)  -2 to 2 mmol/L Final    POC SATURATED O2 10/12/2024 63  95 - 100 % Final    POC Lactate 10/12/2024 0.69  0.5 - 2.2 mmol/L Final    POC TCO2 10/12/2024 24  24 - 29 mmol/L Final    Sample 10/12/2024 VENOUS   Final    Site 10/12/2024 Other   Final    Allens Test 10/12/2024 N/A   Final    POC Cardiac Troponin I 10/12/2024 0.00  0.00 - 0.08 ng/mL Final    Sample 10/12/2024 unknown   Final    Comment: A single negative troponin is insufficient to rule out myocardial infarction.  The use of a serial sampling protocol is recommended practice. Correlate results with reference intervals established for methodology used. Point of care and core laboratory   troponin results are not interchangeable.      Albumin, POC 10/12/2024 3.6  3.3 - 5.5 g/dL Final    Alkaline Phosphatase, POC 10/12/2024 171 (H)  42 - 141 U/L Final    ALT (SGPT), POC 10/12/2024 360 (H)  10 - 47 U/L Final    Amylase, POC 10/12/2024 71  14 - 97 U/L Final    AST (SGOT), POC 10/12/2024 749 (H)  11 - 38 U/L Final    POC GGT 10/12/2024 625 (H)  5 - 65 U/L Final    Bilirubin, POC 10/12/2024 0.6  0.2 - 1.6 mg/dL Final    Protein, POC 10/12/2024 7.6  6.4 - 8.1 g/dL Final    Albumin, POC 10/12/2024 3.5  3.3 - 5.5 g/dL Final     Alkaline Phosphatase, POC 10/12/2024 184 (H)  42 - 141 U/L Final    ALT (SGPT), POC 10/12/2024 359 (H)  10 - 47 U/L Final    AST (SGOT), POC 10/12/2024 735 (H)  11 - 38 U/L Final    POC BUN 10/12/2024 12  7 - 22 mg/dL Final    Calcium, POC 10/12/2024 9.9  8.0 - 10.3 mg/dL Final    POC Chloride 10/12/2024 106  98 - 108 mmol/L Final    POC Creatinine 10/12/2024 0.7  0.6 - 1.2 mg/dL Final    POC Glucose 10/12/2024 109  73 - 118 mg/dL Final    POC Potassium 10/12/2024 3.9  3.6 - 5.1 mmol/L Final    POC Sodium 10/12/2024 140  128 - 145 mmol/L Final    Bilirubin, POC 10/12/2024 0.7  0.2 - 1.6 mg/dL Final    POC TCO2 10/12/2024 25  18 - 33 mmol/L Final    Protein, POC 10/12/2024 7.7  6.4 - 8.1 g/dL Final    POC B-Type Natriuretic Peptide 10/12/2024 7.9  0.0 - 100.0 pg/mL Final        Imaging Reviewed    Imaging Results              US Abdomen Complete (Final result)  Result time 10/13/24 01:38:46      Final result by Rachel Denis MD (10/13/24 01:38:46)                   Impression:      No cholelithiasis or evidence of acute cholecystitis.    Questionable mild hepatic steatosis.      Electronically signed by: Rachel Denis  Date:    10/13/2024  Time:    01:38               Narrative:    EXAMINATION:  ULTRASOUND OF THE ABDOMEN    CLINICAL HISTORY:  Right upper quadrant pain    TECHNIQUE:  Real-time abdominal ultrasound was performed.    COMPARISON:  CT 10/12/2024    FINDINGS:  The liver is normal in size and questionably mild increase in echogenicity without evidence of hepatomegaly or focal lesions.    The partially contracted gallbladder contains no calcified gallstones.  Gallbladder wall is not thickened and measures 1.9 mm.    The visualized pancreas is unremarkable.    The common bile duct measures 5.1 mm.    Both kidneys are normal in size without hydronephrosis. The right kidney measures 12.1 cm in length. The left kidney measures 12.1 cm in length.    The spleen is normal in size, measuring 8.4 x 3.7  cm.    The IVC is unremarkable.  The abdominal aorta is partially obscured by overlying bowel gas.                                       CT Abdomen Pelvis With IV Contrast NO Oral Contrast (Final result)  Result time 10/13/24 00:10:35      Final result by Garrett Vizcarra MD (10/13/24 00:10:35)                   Impression:      No acute findings.  No bowel obstruction.    Additional findings, as above, similar compared to prior.      Electronically signed by: Garrett Vizcarra MD  Date:    10/13/2024  Time:    00:10               Narrative:    EXAMINATION:  CT ABDOMEN PELVIS WITH IV CONTRAST    CLINICAL HISTORY:  Bowel obstruction suspected;Epigastric pain;    TECHNIQUE:  Low dose axial images, sagittal and coronal reformations were obtained from the lung bases to the pubic symphysis following the IV administration of 75 mL of Omnipaque 350 .  Oral contrast was not administered.    COMPARISON:  03/16/2023.    FINDINGS:  Abdomen:    - Lower thorax:Unremarkable.    - Lung bases: No infiltrates and no nodules.    - Liver: No focal mass.    - Gallbladder: No calcified gallstones.    - Bile Ducts: No evidence of intra or extra hepatic biliary ductal dilation.    - Spleen: Negative.    - Kidneys: Bilateral nonobstructing punctate calculi, similar to prior.  No hydronephrosis.    - Adrenals: Unremarkable.    - Pancreas: No mass or peripancreatic fat stranding.    - Retroperitoneum:  No significant adenopathy.    - Vascular: No abdominal aortic aneurysm.    - Abdominal wall:  Fat containing umbilical hernia, similar to prior.    Pelvis:    No pelvic mass, adenopathy, or free fluid.  Uterus is absent.  Bilateral clips in the anterior pelvis appears similar to prior.    Bowel/Mesentery:    No evidence of bowel obstruction or inflammation.  Normal appendix.    Bones:  L1 compression fracture, unchanged from prior.                                      Medications given in ED    Medications   ondansetron injection 4 mg (4 mg  Intravenous Given 10/12/24 2210)   acetaminophen tablet 1,000 mg (1,000 mg Oral Given 10/12/24 2144)   iohexoL (OMNIPAQUE 350) injection 75 mL (75 mLs Intravenous Given 10/12/24 2259)   aluminum-magnesium hydroxide-simethicone 200-200-20 mg/5 mL suspension 30 mL (30 mLs Oral Given 10/13/24 0204)       Note was created using voice recognition software. Note may have occasional typographical errors that may not have been identified and edited despite good jessica initial review prior to signing.         Medical Decision Making:   Differential Diagnosis:   Cholecystitis, pancreatitis, lower lobe pneumonia, GERD, gastritis, bowel obstruction, pyelonephritis, ureterolithiasis, atypical appendicitis, ACS,  vascular catastrophe, others    Clinical Tests:   Lab Tests: Ordered and Reviewed  Radiological Study: Ordered and Reviewed  Medical Tests: Reviewed and Ordered  ED Management:  Lab results, imaging results, outpatient management plan, outpatient PCP/hepatology follow up and ED return precautions discussed with patient with understanding and agreement.                Clinical Impression:  Final diagnoses:  [R10.10] Upper abdominal pain  [R10.11] Right upper quadrant abdominal pain  [R79.89] Elevated liver function tests (Primary)          ED Disposition Condition    Discharge Stable          ED Prescriptions       Medication Sig Dispense Start Date End Date Auth. Provider    magnesium citrate solution () Take 296 mLs by mouth once. For bowel cleanse for 1 dose 296 mL 10/13/2024 10/13/2024 Morelia Lagos MD    dicyclomine (BENTYL) 20 mg tablet Take 1 tablet (20 mg total) by mouth before meals and at bedtime as needed (abdominal cramps). 30 tablet 10/13/2024 2024 Morelia Lagos MD          Follow-up Information       Follow up With Specialties Details Why Contact Info Additional Information    The nearest emergency department.  Go to  As needed, If symptoms worsen      Your PCP  Call  As needed, for ongoing  [facility]      Hepatology Clinic - [facility] Hepatology Call  Monday morning, to schedule an appointment, for re-evaluation of today's complaint, and ongoing care [address]  [facility] [address] [phone] [facility] - Main Building, 1st floor near Clinic elevator [facility] park in [facility] and walk through Clinic elevator hallway             Morelia Lagos MD  10/21/24 0019

## 2024-10-14 LAB
OHS QRS DURATION: 78 MS
OHS QTC CALCULATION: 433 MS

## 2024-10-22 ENCOUNTER — CLINICAL SUPPORT (OUTPATIENT)
Dept: SMOKING CESSATION | Facility: CLINIC | Age: 49
End: 2024-10-22
Payer: MEDICAID

## 2024-10-22 DIAGNOSIS — F17.200 NICOTINE DEPENDENCE: Primary | ICD-10-CM

## 2024-10-22 PROCEDURE — 99999 PR PBB SHADOW E&M-EST. PATIENT-LVL II: CPT | Mod: PBBFAC,,,

## 2024-10-22 PROCEDURE — 99404 PREV MED CNSL INDIV APPRX 60: CPT | Mod: ,,, | Performed by: FAMILY MEDICINE

## 2024-10-22 RX ORDER — MICONAZOLE NITRATE 2 %
2 CREAM (GRAM) TOPICAL
Qty: 100 EACH | Refills: 0 | Status: SHIPPED | OUTPATIENT
Start: 2024-10-22 | End: 2024-11-29

## 2024-10-22 RX ORDER — IBUPROFEN 200 MG
1 TABLET ORAL DAILY
Qty: 14 PATCH | Refills: 0 | Status: SHIPPED | OUTPATIENT
Start: 2024-10-22 | End: 2024-11-22

## 2024-10-22 NOTE — Clinical Note
"Patient presents for intake in the clinic she is currently smoking 1 pk per day, she recently had an er visit due to pain in her abd which resulted in "an inflamed liver" per pt and she is trying to quit drinking alcohol and quit smoking, she is motivated and wants to get healthier over all, after assessment and discussion recommend the 21mg nicotine patch and the 2mg nicotine gum, explained how to use the nrt and how it works as well as how to avoid common possible s/e that are associated with the two nrt, sent scripts to local pharmacy of choice for normal insurance to be used, discussed stress and habit and the idea of an abrupt quit to completely stop smoking right away, encouragement and session handout discussed will follow in clinic "

## 2024-11-06 ENCOUNTER — TELEPHONE (OUTPATIENT)
Dept: SMOKING CESSATION | Facility: CLINIC | Age: 49
End: 2024-11-06
Payer: MEDICAID

## 2024-11-18 DIAGNOSIS — F17.200 NICOTINE DEPENDENCE: ICD-10-CM

## 2024-11-20 RX ORDER — IBUPROFEN 200 MG
TABLET ORAL
Qty: 14 PATCH | Refills: 0 | Status: SHIPPED | OUTPATIENT
Start: 2024-11-20

## 2024-11-20 NOTE — TELEPHONE ENCOUNTER
Refill Routing Note   Medication(s) are not appropriate for processing by [facility] for the following reason(s):        Outside of protocol  Responsible provider unclear    [facility] action(s):  Route        Medication Therapy Plan: No assigned PCP      Appointments  past 12m or future 3m with PCP    Date Provider   Last Visit   Visit date not found Thania Weinberg MD   Next Visit   Visit date not found Thania Weinberg MD   ED visits in past 90 days: 1        Note composed:11:06 PM 11/19/2024

## 2025-01-09 ENCOUNTER — TELEPHONE (OUTPATIENT)
Dept: SMOKING CESSATION | Facility: CLINIC | Age: 50
End: 2025-01-09
Payer: MEDICAID

## 2025-01-09 ENCOUNTER — CLINICAL SUPPORT (OUTPATIENT)
Dept: SMOKING CESSATION | Facility: CLINIC | Age: 50
End: 2025-01-09
Payer: MEDICAID

## 2025-01-09 DIAGNOSIS — F17.200 NICOTINE DEPENDENCE: Primary | ICD-10-CM

## 2025-01-09 PROCEDURE — 99999 PR PBB SHADOW E&M-EST. PATIENT-LVL I: CPT | Mod: PBBFAC,,,

## 2025-01-09 NOTE — PROGRESS NOTES
Called pt to f/u on her 3 month smoking cessation quit status. Pt stated she is still smoking. Informed her she has benefits available and is able to rejoin. Pt not ready to make appointment. She will call back when ready. Stated she never started patches and has a lot going on right now to focus on quit. Informed her of benefit period, phone follow ups, and contact information. Will complete smart form and will continue to follow up on quit #1 episode.

## 2025-04-09 ENCOUNTER — CLINICAL SUPPORT (OUTPATIENT)
Dept: SMOKING CESSATION | Facility: CLINIC | Age: 50
End: 2025-04-09
Payer: MEDICAID

## 2025-04-09 DIAGNOSIS — F17.200 NICOTINE DEPENDENCE: Primary | ICD-10-CM

## 2025-04-09 PROCEDURE — 99999 PR PBB SHADOW E&M-EST. PATIENT-LVL I: CPT | Mod: PBBFAC,,,

## 2025-04-09 PROCEDURE — 99406 BEHAV CHNG SMOKING 3-10 MIN: CPT | Mod: S$PBB,,, | Performed by: STUDENT IN AN ORGANIZED HEALTH CARE EDUCATION/TRAINING PROGRAM

## 2025-04-09 NOTE — PROGRESS NOTES
Spoke with patient today in regard to smoking cessation progress for 6 month telephone follow up. Patient states she is not tobacco free at this time, and reports her doctor has advised her to quit to help control her blood pressure. Scheduled appointment for patient to return for Quit #2. Informed patient of benefit period, future follow ups, and contact information if any further help or support is needed. Will complete smart form for 6 month follow up on Quit attempt #1.

## 2025-04-15 ENCOUNTER — TELEPHONE (OUTPATIENT)
Dept: SMOKING CESSATION | Facility: CLINIC | Age: 50
End: 2025-04-15
Payer: MEDICAID

## 2025-04-17 ENCOUNTER — HOSPITAL ENCOUNTER (EMERGENCY)
Facility: HOSPITAL | Age: 50
Discharge: HOME | End: 2025-04-17
Attending: EMERGENCY MEDICINE
Payer: MEDICAID

## 2025-04-17 VITALS
WEIGHT: 155 LBS | TEMPERATURE: 99 F | SYSTOLIC BLOOD PRESSURE: 135 MMHG | DIASTOLIC BLOOD PRESSURE: 84 MMHG | OXYGEN SATURATION: 99 % | HEART RATE: 97 BPM | HEIGHT: 64 IN | BODY MASS INDEX: 26.46 KG/M2 | RESPIRATION RATE: 20 BRPM

## 2025-04-17 DIAGNOSIS — J45.21 MILD INTERMITTENT REACTIVE AIRWAY DISEASE WITH ACUTE EXACERBATION: ICD-10-CM

## 2025-04-17 DIAGNOSIS — R05.9 COUGH: ICD-10-CM

## 2025-04-17 DIAGNOSIS — J03.90 TONSILLITIS: Primary | ICD-10-CM

## 2025-04-17 LAB
CTP QC/QA: YES
INFLUENZA A ANTIGEN, POC: NEGATIVE
INFLUENZA B ANTIGEN, POC: NEGATIVE
POC RAPID STREP A: NEGATIVE
SARS-COV-2 RDRP RESP QL NAA+PROBE: NEGATIVE

## 2025-04-17 PROCEDURE — 87635 SARS-COV-2 COVID-19 AMP PRB: CPT | Mod: ER | Performed by: EMERGENCY MEDICINE

## 2025-04-17 PROCEDURE — 99285 EMERGENCY DEPT VISIT HI MDM: CPT | Mod: 25,ER

## 2025-04-17 PROCEDURE — 87804 INFLUENZA ASSAY W/OPTIC: CPT | Mod: ER

## 2025-04-17 PROCEDURE — 87880 STREP A ASSAY W/OPTIC: CPT | Mod: ER

## 2025-04-17 PROCEDURE — 63600175 PHARM REV CODE 636 W HCPCS: Mod: JZ,TB,ER | Performed by: EMERGENCY MEDICINE

## 2025-04-17 PROCEDURE — 96372 THER/PROPH/DIAG INJ SC/IM: CPT | Performed by: EMERGENCY MEDICINE

## 2025-04-17 PROCEDURE — 25000242 PHARM REV CODE 250 ALT 637 W/ HCPCS: Mod: ER | Performed by: EMERGENCY MEDICINE

## 2025-04-17 PROCEDURE — 94640 AIRWAY INHALATION TREATMENT: CPT | Mod: ER

## 2025-04-17 RX ORDER — IPRATROPIUM BROMIDE AND ALBUTEROL SULFATE 2.5; .5 MG/3ML; MG/3ML
3 SOLUTION RESPIRATORY (INHALATION) ONCE
Status: COMPLETED | OUTPATIENT
Start: 2025-04-17 | End: 2025-04-17

## 2025-04-17 RX ORDER — ALBUTEROL SULFATE 2.5 MG/.5ML
5 SOLUTION RESPIRATORY (INHALATION)
Status: COMPLETED | OUTPATIENT
Start: 2025-04-17 | End: 2025-04-17

## 2025-04-17 RX ORDER — DEXAMETHASONE SODIUM PHOSPHATE 4 MG/ML
12 INJECTION, SOLUTION INTRA-ARTICULAR; INTRALESIONAL; INTRAMUSCULAR; INTRAVENOUS; SOFT TISSUE
Status: COMPLETED | OUTPATIENT
Start: 2025-04-17 | End: 2025-04-17

## 2025-04-17 RX ORDER — IPRATROPIUM BROMIDE 0.5 MG/2.5ML
0.5 SOLUTION RESPIRATORY (INHALATION) ONCE
Status: COMPLETED | OUTPATIENT
Start: 2025-04-17 | End: 2025-04-17

## 2025-04-17 RX ORDER — PREDNISONE 20 MG/1
40 TABLET ORAL DAILY
Qty: 8 TABLET | Refills: 0 | Status: SHIPPED | OUTPATIENT
Start: 2025-04-17 | End: 2025-04-21

## 2025-04-17 RX ORDER — KETOROLAC TROMETHAMINE 30 MG/ML
30 INJECTION, SOLUTION INTRAMUSCULAR; INTRAVENOUS
Status: COMPLETED | OUTPATIENT
Start: 2025-04-17 | End: 2025-04-17

## 2025-04-17 RX ORDER — DIPHENHYDRAMINE HYDROCHLORIDE 50 MG/ML
50 INJECTION, SOLUTION INTRAMUSCULAR; INTRAVENOUS
Status: COMPLETED | OUTPATIENT
Start: 2025-04-17 | End: 2025-04-17

## 2025-04-17 RX ADMIN — DIPHENHYDRAMINE HYDROCHLORIDE 50 MG: 50 INJECTION INTRAMUSCULAR; INTRAVENOUS at 10:04

## 2025-04-17 RX ADMIN — ALBUTEROL SULFATE 5 MG: 2.5 SOLUTION RESPIRATORY (INHALATION) at 09:04

## 2025-04-17 RX ADMIN — IPRATROPIUM BROMIDE 0.5 MG: 0.5 SOLUTION RESPIRATORY (INHALATION) at 09:04

## 2025-04-17 RX ADMIN — IPRATROPIUM BROMIDE AND ALBUTEROL SULFATE 3 ML: .5; 3 SOLUTION RESPIRATORY (INHALATION) at 08:04

## 2025-04-17 RX ADMIN — KETOROLAC TROMETHAMINE 30 MG: 30 INJECTION, SOLUTION INTRAMUSCULAR; INTRAVENOUS at 08:04

## 2025-04-17 RX ADMIN — DEXAMETHASONE SODIUM PHOSPHATE 12 MG: 4 INJECTION INTRA-ARTICULAR; INTRALESIONAL; INTRAMUSCULAR; INTRAVENOUS; SOFT TISSUE at 08:04

## 2025-04-17 NOTE — ED PROVIDER NOTES
Encounter Date: 4/17/2025    SCRIBE #1 NOTE: I, Toni Grigsby, am scribing for, and in the presence of,  Ish Guidry MD. I have scribed the following portions of the note - Other sections scribed: HPI,ROS.       History     Chief Complaint   Patient presents with    URI     Pt presents w/ a c/o of URI sx (cough, congestion, headache, and generalized body aches) for approximately two days.     Marylin Jane is a 50 y.o. female, with a PMHx of asthma and HTN, who presents to the ED with cough onset 2-3 days ago. Patient reports associated symptoms of headache, myalgias, congestion and chest pain secondary to cough. Patient reports that she saw her PCP 1 day ago where she was prescribed an antibiotic and promethazine. Patient reports that her symptoms worsened today which is what brought her into the ED. Patient reports possible sick contacts after attending the [place] festival prior to the onset of symptoms. Patient endorses daily compliance with zyrtec, singular, beets chewable tablets and amlodipine. She denies any recent use of her inhaler or nebulizer. Patient reports tobacco use. No other exacerbating or alleviating factors.     The history is provided by the patient. No  was used.     Review of patient's allergies indicates:   Allergen Reactions    Aspirin Hives    Toradol [ketorolac] Hives     Past Medical History:   Diagnosis Date    Asthma     Hypertension      History reviewed. No pertinent surgical history.  No family history on file.  Social History[1]  Review of Systems   Constitutional: Negative.    HENT:  Positive for congestion.    Eyes: Negative.    Respiratory:  Positive for cough.    Cardiovascular:  Positive for chest pain.   Gastrointestinal: Negative.    Genitourinary: Negative.    Musculoskeletal:  Positive for myalgias.   Skin: Negative.    Neurological:  Positive for headaches.       Physical Exam     Initial Vitals [04/17/25 0750]   BP Pulse Resp Temp SpO2    (!) 143/83 110 20 99.3 °F (37.4 °C) 100 %      MAP       --         Physical Exam    Nursing note and vitals reviewed.  Constitutional: She appears well-developed. She is not diaphoretic. She appears distressed (mildly).   HENT:   Head: Normocephalic and atraumatic.   Nose: Nose normal. Mouth/Throat: Oropharyngeal exudate present.   Tonsillar enlargement bilaterally, no uvular deviation, some exudates noted notably on the left side, submandibular lymphadenopathy present.   Eyes: EOM are normal. Pupils are equal, round, and reactive to light.   Neck: Neck supple. No JVD present.   Normal range of motion.  Cardiovascular:  Regular rhythm, normal heart sounds and intact distal pulses.           Tachycardic   Pulmonary/Chest: No stridor. She is in respiratory distress (tachypneic, mild wheeze noted over the left base). She has wheezes. She has no rhonchi. She has no rales.   Abdominal: Abdomen is soft. Bowel sounds are normal. She exhibits no distension. There is no abdominal tenderness.   Musculoskeletal:         General: No tenderness or edema. Normal range of motion.      Cervical back: Normal range of motion and neck supple.     Lymphadenopathy:     She has cervical adenopathy.   Neurological: She is alert and oriented to person, place, and time. She has normal strength.   Skin: Skin is warm and dry. Capillary refill takes less than 2 seconds. No rash noted. No erythema.         ED Course   Procedures  Labs Reviewed   SARS-COV-2 RDRP GENE       Result Value    POC Rapid COVID Negative       Acceptable Yes      Narrative:     This test utilizes isothermal nucleic acid amplification technology to detect the SARS-CoV-2 RdRp nucleic acid segment. The analytical sensitivity (limit of detection) is 500 copies/swab.     A POSITIVE result is indicative of the presence of SARS-CoV-2 RNA; clinical correlation with patient history and other diagnostic information is necessary to determine patient infection  status.    A NEGATIVE result means that SARS-CoV-2 nucleic acids are not present above the limit of detection. A NEGATIVE result should be treated as presumptive. It does not rule out the possibility of COVID-19 and should not be the sole basis for treatment decisions. If COVID-19 is strongly suspected based on clinical and exposure history, re-testing using an alternate molecular assay should be considered.     Commercial kits are provided by [employer].       POCT RAPID INFLUENZA A/B    Influenza B Ag negative      Inflenza A Ag negative     POCT STREP A, RAPID    POC Rapid Strep A negative            Imaging Results              X-Ray Chest PA And Lateral (Final result)  Result time 04/17/25 09:04:12      Final result by Mode Olivarez MD (04/17/25 09:04:12)                   Impression:      No acute abnormality.      Electronically signed by: Mode Olivarez MD  Date:    04/17/2025  Time:    09:04               Narrative:    EXAMINATION:  XR CHEST PA AND LATERAL    CLINICAL HISTORY:  Cough, unspecified    TECHNIQUE:  PA and lateral views of the chest were performed.    COMPARISON:  Chest radiograph from 06/04/2024    FINDINGS:  The lungs are clear, with normal appearance of pulmonary vasculature and no pleural effusion or pneumothorax.    The cardiac silhouette is normal in size. The hilar and mediastinal contours are unremarkable.    Bones are intact.                                       Medications   albuterol-ipratropium 2.5 mg-0.5 mg/3 mL nebulizer solution 3 mL (3 mLs Nebulization Given 4/17/25 0838)   dexAMETHasone injection 12 mg (12 mg Intramuscular Given 4/17/25 0833)   ketorolac injection 30 mg (30 mg Intramuscular Given 4/17/25 0833)   albuterol sulfate nebulizer solution 5 mg (5 mg Nebulization Given 4/17/25 0949)   ipratropium 0.02 % nebulizer solution 0.5 mg (0.5 mg Nebulization Given 4/17/25 0950)   diphenhydrAMINE injection 50 mg (50 mg Intramuscular Given 4/17/25 1017)      Medical Decision Making  Amount and/or Complexity of Data Reviewed  Labs: ordered.  Radiology: ordered.    Risk  Prescription drug management.      MDM:    50-year-old female with past medical history as noted above presenting with cough, chills, headache.  Differential Diagnosis includes:  Viral syndrome, influenza, COVID, pneumonia.  Physical exam as noted above.  ED workup notable for chest x-ray unremarkable, flu and COVID negative, strep negative.  Patient presentation does not appear more consistent with tonsillitis, already on antibiotics for this, will give steroid here, Toradol, albuterol treatment.  She appears markedly improved upon reassessment, some mild allergy or hypersensitivity to the Toradol, Benadryl with subsequently given with complete improvement.  She is not anaphylactic it appears well at this time for discharge and outpatient follow-up.  Do not suspect any additional surgical or medical emergency. Discussed diagnosis and further treatment with patient and family at bedside, including f/u.  Return precautions given and all questions answered.  Patient and family in understanding of plan.  Pt discharged to home improved and stable.        Note was created using voice recognition software. Note may have occasional typographical or grammatical errors, garbled syntax, and other bizarre constructions that may not have been identified and edited despite good jessica initial review prior to signing.             Scribe Attestation:   Scribe #1: I performed the above scribed service and the documentation accurately describes the services I performed. I attest to the accuracy of the note.              I, Ish Guidry M.D., personally performed the services described in this documentation. All medical record entries made by the scribe were at my direction and in my presence. I have reviewed the chart and agree that the record reflects my personal performance and is accurate and  complete.      DISCLAIMER: This note was prepared with [employer] voice recognition transcription software. Garbled syntax, mangled pronouns, and other bizarre constructions may be attributed to that software system.                  Clinical Impression:  Final diagnoses:  [R05.9] Cough  [J03.90] Tonsillitis (Primary)  [J45.21] Mild intermittent reactive airway disease with acute exacerbation          ED Disposition Condition    Discharge Good          ED Prescriptions       Medication Sig Dispense Start Date End Date Auth. Provider    predniSONE (DELTASONE) 20 MG tablet Take 2 tablets (40 mg total) by mouth once daily. for 4 days 8 tablet 4/17/2025 4/21/2025 Ish Guidry MD          Follow-up Information       Follow up With Specialties Details Why Contact [facility] ED Emergency Medicine Go to  If symptoms worsen [address] [facility] [id number] [phone]    Your Primary Care Physician  Schedule an appointment as soon as possible for a visit  As needed                  [1]   Social History  Tobacco Use    Smoking status: Every Day     Current packs/day: 1.00     Types: Cigarettes    Smokeless tobacco: Current   Substance Use Topics    Alcohol use: Yes    Drug use: Not Currently        Ish Guidry MD  04/17/25 0803

## 2025-04-17 NOTE — Clinical Note
"Marylin"Alberto Jane was seen and treated in our emergency department on 4/17/2025.  She may return to work on 04/21/2025.       If you have any questions or concerns, please don't hesitate to call.      Ish Guidry MD"

## 2025-04-17 NOTE — ED NOTES
Pt remains without progression of allergic reaction symptoms. She also reports significant improvement in itching. Vitals stable no sob reported/noted.

## 2025-04-17 NOTE — ED NOTES
"Pt reports uncomfortable "itching" to face, arms and torso; she suspects she is allergic to something we gave her because this is same reaction she has when she takes aspirin;  small hives noted to jawline. She is currently on an albuterol treatment. Dr. Guidry notified. New orders noted  "

## 2025-04-22 ENCOUNTER — TELEPHONE (OUTPATIENT)
Dept: SMOKING CESSATION | Facility: CLINIC | Age: 50
End: 2025-04-22
Payer: MEDICAID

## 2025-08-23 ENCOUNTER — HOSPITAL ENCOUNTER (EMERGENCY)
Facility: HOSPITAL | Age: 50
Discharge: HOME | End: 2025-08-23
Attending: EMERGENCY MEDICINE
Payer: MEDICAID

## 2025-08-23 VITALS
SYSTOLIC BLOOD PRESSURE: 127 MMHG | TEMPERATURE: 98 F | BODY MASS INDEX: 26.61 KG/M2 | DIASTOLIC BLOOD PRESSURE: 87 MMHG | RESPIRATION RATE: 16 BRPM | OXYGEN SATURATION: 98 % | HEART RATE: 76 BPM | WEIGHT: 155 LBS

## 2025-08-23 DIAGNOSIS — S16.1XXA CERVICAL STRAIN, ACUTE, INITIAL ENCOUNTER: Primary | ICD-10-CM

## 2025-08-23 DIAGNOSIS — R07.89 LEFT-SIDED CHEST WALL PAIN: ICD-10-CM

## 2025-08-23 PROCEDURE — 93005 ELECTROCARDIOGRAM TRACING: CPT

## 2025-08-23 PROCEDURE — 96372 THER/PROPH/DIAG INJ SC/IM: CPT | Performed by: EMERGENCY MEDICINE

## 2025-08-23 PROCEDURE — 25000003 PHARM REV CODE 250: Performed by: EMERGENCY MEDICINE

## 2025-08-23 PROCEDURE — 99284 EMERGENCY DEPT VISIT MOD MDM: CPT | Mod: 25

## 2025-08-23 PROCEDURE — 93010 ELECTROCARDIOGRAM REPORT: CPT | Mod: ,,, | Performed by: INTERNAL MEDICINE

## 2025-08-23 PROCEDURE — 63600175 PHARM REV CODE 636 W HCPCS: Performed by: EMERGENCY MEDICINE

## 2025-08-23 RX ORDER — DEXAMETHASONE SODIUM PHOSPHATE 4 MG/ML
12 INJECTION, SOLUTION INTRA-ARTICULAR; INTRALESIONAL; INTRAMUSCULAR; INTRAVENOUS; SOFT TISSUE
Status: COMPLETED | OUTPATIENT
Start: 2025-08-23 | End: 2025-08-23

## 2025-08-23 RX ORDER — PREDNISONE 20 MG/1
60 TABLET ORAL DAILY
Qty: 15 TABLET | Refills: 0 | Status: SHIPPED | OUTPATIENT
Start: 2025-08-23 | End: 2025-08-28

## 2025-08-23 RX ORDER — IBUPROFEN 200 MG
1 TABLET ORAL DAILY
Qty: 28 PATCH | Refills: 3 | Status: SHIPPED | OUTPATIENT
Start: 2025-08-23

## 2025-08-23 RX ORDER — HYDROCODONE BITARTRATE AND ACETAMINOPHEN 5; 325 MG/1; MG/1
2 TABLET ORAL
Refills: 0 | Status: COMPLETED | OUTPATIENT
Start: 2025-08-23 | End: 2025-08-23

## 2025-08-23 RX ORDER — HYDROCODONE BITARTRATE AND ACETAMINOPHEN 5; 325 MG/1; MG/1
1 TABLET ORAL EVERY 6 HOURS PRN
Qty: 8 TABLET | Refills: 0 | Status: SHIPPED | OUTPATIENT
Start: 2025-08-23

## 2025-08-23 RX ADMIN — HYDROCODONE BITARTRATE AND ACETAMINOPHEN 2 TABLET: 5; 325 TABLET ORAL at 08:08

## 2025-08-23 RX ADMIN — DEXAMETHASONE SODIUM PHOSPHATE 12 MG: 4 INJECTION INTRA-ARTICULAR; INTRALESIONAL; INTRAMUSCULAR; INTRAVENOUS; SOFT TISSUE at 08:08

## 2025-08-24 LAB
OHS QRS DURATION: 84 MS
OHS QTC CALCULATION: 433 MS

## 2025-08-28 ENCOUNTER — OFFICE VISIT (OUTPATIENT)
Dept: CARDIOLOGY | Facility: CLINIC | Age: 50
End: 2025-08-28
Payer: MEDICAID

## 2025-08-28 VITALS
OXYGEN SATURATION: 99 % | HEART RATE: 86 BPM | SYSTOLIC BLOOD PRESSURE: 141 MMHG | DIASTOLIC BLOOD PRESSURE: 98 MMHG | HEIGHT: 64 IN | BODY MASS INDEX: 27.85 KG/M2 | RESPIRATION RATE: 15 BRPM | WEIGHT: 163.13 LBS

## 2025-08-28 DIAGNOSIS — R00.0 TACHYCARDIA: ICD-10-CM

## 2025-08-28 DIAGNOSIS — R07.9 CHEST PAIN, UNSPECIFIED TYPE: ICD-10-CM

## 2025-08-28 DIAGNOSIS — R00.2 HEART PALPITATIONS: ICD-10-CM

## 2025-08-28 DIAGNOSIS — E78.5 DYSLIPIDEMIA: ICD-10-CM

## 2025-08-28 DIAGNOSIS — M79.606 PAIN OF LOWER EXTREMITY, UNSPECIFIED LATERALITY: Primary | ICD-10-CM

## 2025-08-28 PROCEDURE — 99999 PR PBB SHADOW E&M-EST. PATIENT-LVL IV: CPT | Mod: PBBFAC,,, | Performed by: INTERNAL MEDICINE

## 2025-08-28 PROCEDURE — 99214 OFFICE O/P EST MOD 30 MIN: CPT | Mod: S$PBB,,, | Performed by: INTERNAL MEDICINE

## 2025-08-28 PROCEDURE — 3008F BODY MASS INDEX DOCD: CPT | Mod: CPTII,,, | Performed by: INTERNAL MEDICINE

## 2025-08-28 PROCEDURE — 3080F DIAST BP >= 90 MM HG: CPT | Mod: CPTII,,, | Performed by: INTERNAL MEDICINE

## 2025-08-28 PROCEDURE — 1159F MED LIST DOCD IN RCRD: CPT | Mod: CPTII,,, | Performed by: INTERNAL MEDICINE

## 2025-08-28 PROCEDURE — G2211 COMPLEX E/M VISIT ADD ON: HCPCS | Mod: ,,, | Performed by: INTERNAL MEDICINE

## 2025-08-28 PROCEDURE — 3077F SYST BP >= 140 MM HG: CPT | Mod: CPTII,,, | Performed by: INTERNAL MEDICINE

## 2025-08-28 PROCEDURE — 99214 OFFICE O/P EST MOD 30 MIN: CPT | Mod: PBBFAC,PO | Performed by: INTERNAL MEDICINE

## 2025-08-28 RX ORDER — CARVEDILOL 12.5 MG/1
12.5 TABLET ORAL 2 TIMES DAILY
Qty: 180 TABLET | Refills: 3 | Status: SHIPPED | OUTPATIENT
Start: 2025-08-28

## 2025-08-28 RX ORDER — AMLODIPINE BESYLATE 10 MG/1
10 TABLET ORAL DAILY
COMMUNITY